# Patient Record
Sex: MALE | Race: WHITE | Employment: FULL TIME | URBAN - METROPOLITAN AREA
[De-identification: names, ages, dates, MRNs, and addresses within clinical notes are randomized per-mention and may not be internally consistent; named-entity substitution may affect disease eponyms.]

---

## 2021-10-28 ENCOUNTER — HOSPITAL ENCOUNTER (OUTPATIENT)
Dept: PHYSICAL THERAPY | Age: 66
Discharge: HOME | End: 2021-10-28
Payer: COMMERCIAL

## 2021-10-28 PROCEDURE — 97110 THERAPEUTIC EXERCISES: CPT

## 2021-10-28 PROCEDURE — 97140 MANUAL THERAPY 1/> REGIONS: CPT

## 2021-10-28 PROCEDURE — 97162 PT EVAL MOD COMPLEX 30 MIN: CPT

## 2021-10-28 NOTE — PROGRESS NOTES
[address] AT [address], [address] [place] [address] Road  Phone: [phone]   [phone]  PLAN OF CARE / [address] [place] [address] PHYSICAL THERAPY SERVICES  Patient Name: Wesley Monday : 1955   Medical   Diagnosis: Neck pain [M54.2] Treatment Diagnosis: Neck pain [M54.2]  Pain in right shoulder [M25.511]   Onset Date: 2021     Referral Source: Yoana Garcia,  Start of Care [facility]): 10/28/2021   Prior Hospitalization: See medical history Provider #: [id number]   Prior Level of Function: No limitations to ADL/IADL's from shoulder/neck pain; works full time in [employer]   Comorbidities: Afib s/p cardiac ablation 2021, 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   ===========================================================================================  Assessment / key information:  Pt is a 77y.o. year old RHD male with subjective complaints of R shoulder and neck pain that started insidiously in 2021. Pt reports remote h/o neck pain ~10 years ago which resolved after cortisone injection. Pt states shoulder pain has progressively worsened since onset and limits ability to sleep, reach or lift. Denies red flags. Pt was seen by MD with xrays taken, however pt is unsure of findings; does not recall any arthritis. Current pain is rated as 4 to 7/10; localized primarily to R shoulder, also extending to neck and down RUE down medial arm to digits 4/5. Current functional limitations: R sidelying, reaching, lifting, turning head. FOTO score= 49/100 indicating 51% impairment to functional activities.     Today's evaulation is significant for   POSTURE/OBSERVATION: significant FHP, fwd shoulder with [address] IR  FUNCTIONAL ASSESSMENT: seated shoulder flexion 90 deg with UT compensation and p!  ROM CERVICAL:  Flex 45 (min central neck discomfort); ext 40 (min neck discomfort); Lat flex R 15 (ipsilat neck p!), L 30 (ipsilat neck pain); ROT R 54 (ipsilat neck pain), L 52 (ipsilat neck pain)            SHOULDER:  Flex 98/107 (p!), Abd 80/90 (p!);  ER (@~30 deg scaption) 11 (p!). IR (@~30 deg scaption) 55 (p!). L UE grossly WNL throughout. FIR R to lateral glute, L T6  STRENGTH  SHOULDER:  Right  Flex/Abd 3-/5 (p!); ER/IR 4+/5. Biceps 5/5. LUE grossly 5/5. Fair- cervical retraction sitting/supine. SPECIAL TESTS:  CERVICAL:  (-) cervical compression, distraction, Spurling's      SHOULDER:  (+) speed's, lift off, cross arm, neer's. (-) empty, yergerson  ADDITIONAL FINDINGS:   CERVICAL: Moderate ttp and increased tone to b/l cervical paraspinals, sub-occipitals. Limited tolerance for PIVM assessment on cervical spine 2/2 ttp to paraspinals. SHOULDER:  Significant ttp to Right shoulder: LHB, supraspinatus, infraspinatus, teres minor, deltoids. Grade 2 inf GH joint glide with pain c/o. These findings are supportive for diagnosis of cervicalgia with R shoulder adhesive capsulitis. Pt will be a good candidate for skilled PT to address these impairments and promote return to normal ADLs and functional mobility for improved quality of life.    ===========================================================================================  Eval Complexity: History MEDIUM  Complexity : 1-2 comorbidities / personal factors will impact the outcome/ POC ;  Examination  HIGH Complexity : 4+ Standardized tests and measures addressing body structure, function, activity limitation and / or participation in recreation ; Presentation MEDIUM Complexity : Evolving with changing characteristics ;   Decision Making MEDIUM Complexity : FOTO score of 26-74; Overall Complexity MEDIUM  Problem List: pain affecting function, decrease ROM, decrease strength, decrease ADL/ functional abilitiies, decrease activity tolerance and decrease flexibility/ joint mobility   Treatment Plan may include any combination of the following: Therapeutic exercise, Therapeutic activities, Neuromuscular re-education, Physical agent/modality, Manual therapy, Patient education and Self Care training  Patient / Family readiness to learn indicated by: asking questions, trying to perform skills and interest  Persons(s) to be included in education: patient (P) and family support person (FSP);list Gene [place], spouse  Barriers to Learning/Limitations: None  Measures taken, if barriers to learning:    Patient Goal (s): \"relieve pressure from nerve\"   Patient self reported health status: good  Rehabilitation Potential: good   Short Term Goals: To be accomplished in  4  treatments:  1. Pt will be educated in appropriate HEP to decrease pain, increase ROM, increase strength and return pt to PLOF. 2. Pt will increase R shoulder PROM by >/= 15 deg in order to improve ease with dressing. 3. Pt will demonstrate good supine cervical retraction for improved axial stability with sitting/standing ADL's.  Long Term Goals: To be accomplished in  4-6  weeks:  1. Pt will improve FOTO score to >/= 62 to demo a significant improvement in functional activity tolerance. 2. Pt will increase seated shoulder flexion to at least 130 for improved ease to reach to cabinets. ([facility] 90 with UT compensations and p!)  3. Pt will increase R FIR to at least L5 for improved ease with dressing. 4. Pt will increase b/l cervical SB to at least 40 deg for improved ease with sleeping. Frequency / Duration:   Patient to be seen  1-2  times per week for 4-6  weeks: All LTG as above will be assessed and updated every 10 visits or 30 days and progressed as needed    Patient / Caregiver education and instruction: self care, activity modification and exercises  Therapist Signature: Kenton Lopez, PT Date: 74/64/9368   Certification Period: 10/28/21 to 1/26/22 Time: 11:33 AM   ===========================================================================================  I certify that the above Physical Therapy Services are being furnished while the patient is under my care. I agree with the treatment plan and certify that this therapy is necessary. Physician Signature:        Date:       Time:        Lissy Maldonado, DO  Please sign and return to [facility] AT [facility] UNIT or you may fax the signed copy to [phone]. Thank you.

## 2021-10-28 NOTE — PROGRESS NOTES
PHYSICAL THERAPY - DAILY TREATMENT NOTE    Patient Name: Barron Lugo        Date: 10/28/2021  : 1955   yes Patient  Verified  Visit #:   1     Insurance: Payor: Fer Ferguson / Plan: [address] [place] [address] [employer] HMO / Product Type: HMO /      In time: 3:54 Out time: 4:47   Total Treatment Time: 53     Medicare/BCBS Time Tracking (below)   Total Timed Codes (min):  53 1:1 Treatment Time:  53     TREATMENT AREA =  Neck pain [M54.2]    SUBJECTIVE  Pain Level (on 0 to 10 scale):  6  / 10   Medication Changes/New allergies or changes in medical history, any new surgeries or procedures?    no  If yes, update Summary List   Subjective Functional Status/Changes:  []  No changes reported     See POC          OBJECTIVE    See exam on chart for details on objective findings    10 min Therapeutic Exercise:  [x]  See flow sheet   Rationale:      increase ROM and increase strength to improve the patients ability to lift, reach for ADL's     15 min Manual Therapy: STM R biceps, pecs, deltoid. PROM R shoulder to tolerance all planes. Gentle GH joint oscillations for decreased mm guarding. Grade 2-3 post GH joint mobs (not tolerated inf. Mobs)   Rationale:      decrease pain, increase ROM, increase tissue extensibility and decrease trigger points to improve patient's ability to lift, reach for ADL's  The manual therapy interventions were performed at a separate and distinct time from the therapeutic activities interventions. Billed With/As:   [x] TE   [] TA   [] Neuro   [] Self Care Patient Education: [x] Review HEP    [] Progressed/Changed HEP based on:   [] positioning   [] body mechanics   [] transfers   [] heat/ice application    [] other:      Other Objective/Functional Measures:    Review HEP, handout created and issued to pt. as per chart. Pt educated in spinal/shoulder anatomy, function and dysfunction as related to diagnosis. Instructed in open packed position with supine lying/reclined for sleeping.  Reviewed POC and goals. Pt reports understanding. Post Treatment Pain Level (on 0 to 10) scale:   4-5  / 10     ASSESSMENT  Assessment/Changes in Function:     See POC     []  See Progress Note/Recertification   Patient will continue to benefit from skilled PT services to modify and progress therapeutic interventions, address functional mobility deficits, address ROM deficits, address strength deficits, analyze and address soft tissue restrictions, analyze and cue movement patterns, analyze and modify body mechanics/ergonomics, assess and modify postural abnormalities and instruct in home and community integration to attain remaining goals. Progress toward goals / Updated goals:    See POC     PLAN  []  Upgrade activities as tolerated yes Continue plan of care   []  Discharge due to :    []  Other:      Therapist: Erika Rolon.  Jono Murdock, PT    Date: 10/28/2021 Time: 11:33 AM     Future Appointments   Date Time Provider Narciso Hancock   11/3/2021  7:45 AM Abby Sethi [facility]   11/5/2021  8:30 AM Jono Carrasco, PTA [facility]   11/8/2021  5:30 PM Jono Carrasco, PTA [facility]   11/11/2021  6:00 PM Joan Bank [facility]   11/16/2021  7:45 AM Yohana Jarrett [facility]   11/18/2021  7:45 AM Jono Carrasco, PTA [facility]   11/22/2021  5:30 PM Jono Carrasco, PTA [facility]   11/24/2021  3:45 PM Joan Bank [facility]   11/30/2021  5:30 PM [facility]   12/2/2021  6:00 PM [facility]

## 2021-11-03 ENCOUNTER — HOSPITAL ENCOUNTER (OUTPATIENT)
Dept: PHYSICAL THERAPY | Age: 66
Discharge: HOME | End: 2021-11-03
Payer: COMMERCIAL

## 2021-11-03 PROCEDURE — 97110 THERAPEUTIC EXERCISES: CPT

## 2021-11-03 PROCEDURE — 97140 MANUAL THERAPY 1/> REGIONS: CPT

## 2021-11-03 NOTE — PROGRESS NOTES
PHYSICAL THERAPY - DAILY TREATMENT NOTE      Patient Name: Austin Buckley        Date: 11/3/2021  : 1955   YES Patient  Verified  Visit #:   2   of     Insurance: Payor: Tanvir Haji / Plan: [employer] HMO / Product Type: HMO /      In time: 7:48a Out time: 8:48a   Total Treatment Time: 60     Medicare Time Tracking (below)   Total Timed Codes (min):  50 1:1 Treatment Time:  50     TREATMENT AREA =  Neck pain [M54.2]    SUBJECTIVE    Pain Level (on 0 to 10 scale):  6 / 10   Medication Changes/New allergies or changes in medical history, any new surgeries or procedures? NO    If yes, update Summary List   Subjective Functional Status/Changes:  []  No changes reported     Pt reports he has been working on his HEP and it is going well. OBJECTIVE    Modalities Rationale:        min [] Estim, type/location:                                      []  att     []  unatt     []  w/US     []  w/ice    []  w/heat    min []  Mechanical Traction: type/lbs                   []  pro   []  sup   []  int   []  cont    []  before manual    []  after manual    min []  Ultrasound, settings/location:      min []  Iontophoresis w/ dexamethasone, location:                                               []  take home patch       []  in clinic   10 min []  Ice     [x]  Heat    location/position:  To c spine and R shoulder in supine    min []  Vasopneumatic Device, press/temp: If using vaso (only need to measure limb vaso being performed on)      pre-treatment girth :       post-treatment girth :       measured at (landmark location) :    min []  Other:    [] Skin assessment post-treatment (if applicable):    []  intact    []  redness- no adverse reaction     []redness  adverse reaction:      40 min Therapeutic Exercise:  [x]  See flow sheet   Rationale:      increase ROM and increase strength to improve the patients ability to reach overhead     10 min Manual Therapy: Gr III inferior glides to R shoulder followed by PROM in all planes, stm to cervical paraspinals and manual upper trap stretching. Rationale:      decrease pain and increase tissue extensibility to improve patient's ability to reach overhead  [The manual therapy interventions were performed at a separate and distinct time from the therapeutic activities interventions]    x min Patient Education:  YES  Reviewed HEP   []  Progressed/Changed HEP based on: Other Objective/Functional Measures:    Program initiated today-1st follow up     Post Treatment Pain Level (on 0 to 10) scale:   3  / 10     ASSESSMENT    Assessment/Changes in Function:   Pt required vc and demo with all exercises to perform correctly. He was challenged with wall ladder and FIR with strap reporting fatigue and mild inc in sx following activities which he reported was tolerable to continue. Pt responded well to manual therapy reporting dec in sx following tx. Pt was noted to be most limited in ER/IR. PT considering utilizing heat prior to tx NV for improved tissue pliability. []  See Progress Note/Recertification   Patient will continue to benefit from skilled PT services to modify and progress therapeutic interventions, address functional mobility deficits, address ROM deficits, address strength deficits, analyze and address soft tissue restrictions, analyze and cue movement patterns, analyze and modify body mechanics/ergonomics, assess and modify postural abnormalities, address imbalance/dizziness and instruct in home and community integration to attain remaining goals. Progress toward goals / Updated goals: · Short Term Goals: To be accomplished in  4  treatments:  1. Pt will be educated in appropriate HEP to decrease pain, increase ROM, increase strength and return pt to PLOF.   pt reports compliance with initial HEP 11/3/21  2. Pt will increase R shoulder PROM by >/= 15 deg in order to improve ease with dressing.   3. Pt will demonstrate good supine cervical retraction for improved axial stability with sitting/standing ADL's.     · Long Term Goals: To be accomplished in  4-6  weeks:  1. Pt will improve FOTO score to >/= 62 to demo a significant improvement in functional activity tolerance. 2. Pt will increase seated shoulder flexion to at least 130 for improved ease to reach to cabinets. ([facility] 90 with UT compensations and p!)  3. Pt will increase R FIR to at least L5 for improved ease with dressing. 4. Pt will increase b/l cervical SB to at least 40 deg for improved ease with sleeping.      PLAN    []  Upgrade activities as tolerated YES Continue plan of care   []  Discharge due to :    []  Other:      Therapist: Meme Jordan    Date: 11/3/2021 Time: 6:56 AM     Future Appointments   Date Time Provider Narciso Hancock   11/3/2021  7:45 AM Milus Eth [facility]   11/5/2021  8:30 AM Arianna King, PTA [facility]   11/8/2021  5:30 PM Arianna King, PTA [facility]   11/11/2021  6:00 PM Milus Eth [facility]   11/16/2021  7:45 AM Kaylie Ayon [facility]   11/18/2021  7:45 AM Arianna King, PTA [facility]   11/22/2021  5:30 PM Arianna King, PTA [facility]   11/24/2021  3:45 PM Milus Eth [address] [facility]   11/30/2021  5:30 PM Milus Eth [facility]   12/2/2021  6:00 PM Milus Eth [facility]

## 2021-11-05 ENCOUNTER — HOSPITAL ENCOUNTER (OUTPATIENT)
Dept: PHYSICAL THERAPY | Age: 66
Discharge: HOME | End: 2021-11-05
Payer: COMMERCIAL

## 2021-11-05 PROCEDURE — 97110 THERAPEUTIC EXERCISES: CPT

## 2021-11-05 PROCEDURE — 97140 MANUAL THERAPY 1/> REGIONS: CPT

## 2021-11-05 NOTE — PROGRESS NOTES
PHYSICAL THERAPY - DAILY TREATMENT NOTE    Patient Name: Austin Buckley        Date: 2021  : 1955   yes Patient  Verified  Visit #:   3     Insurance: Payor: Tanvir Haji / Plan: [employer] HMO / Product Type: HMO /      In time: 8:31 Out time: 9:44   Total Treatment Time: 73     Medicare/BCBS Time Tracking (below)   Total Timed Codes (min):   1:1 Treatment Time:       TREATMENT AREA =  Neck pain [M54.2]  Pain in right shoulder [M25.511]    SUBJECTIVE  Pain Level (on 0 to 10 scale):  4  / 10   Medication Changes/New allergies or changes in medical history, any new surgeries or procedures?    no  If yes, update Summary List   Subjective Functional Status/Changes:  []  No changes reported     My shoulder bothers me the most when I am sleeping as well as when I raise my arm up and lift up or try to lift anything.         OBJECTIVE  Modalities Rationale:     decrease inflammation and decrease pain to improve patient's ability to improve functional abilities    min [] Estim, type/location:                                      []  att     []  unatt     []  w/US     []  w/ice    []  w/heat    min []  Mechanical Traction: type/lbs                   []  pro   []  sup   []  int   []  cont    []  before manual    []  after manual    min []  Ultrasound, settings/location:      min []  Iontophoresis w/ dexamethasone, location:                                               []  take home patch       []  in clinic   10 min [x]  Ice     []  Heat    location/position: R shoulder/seated    min []  Vasopneumatic Device, press/temp:    If using vaso (only need to measure limb vaso being performed on)      pre-treatment girth :       post-treatment girth :       measured at (landmark location) :    Vasopnuematic compression justification:  Per bilateral girth measures taken and listed above the edema is considered significant and having an impact on the patient's     min []  Other:    [] Skin assessment post-treatment (if applicable):    []  intact    []  redness- no adverse reaction                  []redness  adverse reaction:      43 min Therapeutic Exercise:  [x]  See flow sheet   Rationale:      increase ROM and increase strength to improve the patients ability to improve functional abilities      20 min Manual Therapy: STM/tissue mobs to R proximal shoulder/lateral scapular musculature, sidelying scapular mobs, supine GH distraction, grade 3 posterior/inferior GH mobs and GH PROM all planes   Rationale:      decrease pain, increase ROM, increase tissue extensibility, decrease trigger points and increase postural awareness to improve patient's ability to improve functional mobility   The manual therapy interventions were performed at a separate and distinct time from the therapeutic activities interventions. Billed With/As:   [] TE   [] TA   [] Neuro   [] Self Care Patient Education: [x] Review HEP    [] Progressed/Changed HEP based on:   [] positioning   [] body mechanics   [] transfers   [] heat/ice application    [] other:      Other Objective/Functional Measures:  Extensive verbal cueing/demonstration with all therex      Post Treatment Pain Level (on 0 to 10) scale:   3-4  / 10     ASSESSMENT  Assessment/Changes in Function:   Pt presented with chief c/o increased shoulder pain/symptoms with sleeping as well as reaching and lifting type ADL's. Pt was very restricted with all [address] PROM in all planes especially in abduction and ER planes even with extensive multi transitional passive stretching between all planes. Will continue to progress/advance patient within current POC as tolerated with monitoring symptoms.      []  See Progress Note/Recertification   Patient will continue to benefit from skilled PT services to modify and progress therapeutic interventions, address functional mobility deficits, address ROM deficits, address strength deficits, analyze and address soft tissue restrictions, analyze and cue movement patterns, analyze and modify body mechanics/ergonomics, assess and modify postural abnormalities and instruct in home and community integration to attain remaining goals. Progress toward goals / Updated goals: · Short Term Goals: To be accomplished in  4  treatments:  1.  Pt will be educated in appropriate HEP to decrease pain, increase ROM, increase strength and return pt to PLOF.   pt reports compliance with initial HEP 11/3/21  2. Pt will increase R shoulder PROM by >/= 15 deg in order to improve ease with dressing. 3. Pt will demonstrate good supine cervical retraction for improved axial stability with sitting/standing ADL's.     · Long Term Goals: To be accomplished in  4-6  weeks:  1. Pt will improve FOTO score to >/= 62 to demo a significant improvement in functional activity tolerance. 2. Pt will increase seated shoulder flexion to at least 130 for improved ease to reach to cabinets. ([facility] 90 with [place] compensations and p!)  3. Pt will increase R FIR to at least L5 for improved ease with dressing. 4. Pt will increase b/l cervical SB to at least 40 deg for improved ease with sleeping.      PLAN  [x]  Upgrade activities as tolerated yes Continue plan of care   []  Discharge due to :    []  Other:      Therapist: Juan Jose Salinas PTA    Date: 11/5/2021 Time: 8:57 AM     Future Appointments   Date Time Provider Narciso Hancock   11/8/2021  5:30 PM Arianna King PTA [facility] [address]   11/11/2021  6:00 PM [facility] [address]   11/16/2021  7:45 AM Kaylie Ayon [facility] [address]   11/18/2021  7:45 AM Arianna King PTA [facility] [address]   11/22/2021  5:30 PM Arianna King PTA [facility] [address]   11/24/2021  3:45 PM [facility] [address]   11/30/2021  5:30 PM [facility] [address]   12/2/2021  6:00 PM [facility] [address]

## 2021-11-08 ENCOUNTER — HOSPITAL ENCOUNTER (OUTPATIENT)
Dept: PHYSICAL THERAPY | Age: 66
Discharge: HOME | End: 2021-11-08
Payer: COMMERCIAL

## 2021-11-08 PROCEDURE — 97140 MANUAL THERAPY 1/> REGIONS: CPT

## 2021-11-08 PROCEDURE — 97110 THERAPEUTIC EXERCISES: CPT

## 2021-11-08 NOTE — PROGRESS NOTES
PHYSICAL THERAPY - DAILY TREATMENT NOTE    Patient Name: Barron Lugo        Date: 2021  : 1955   yes Patient  Verified  Visit #:   4     Insurance: Payor: Fer Ferguson / Plan: [address] [place] [address] [employer] / Product Type: HMO /      In time: 5:30 Out time: 6:45   Total Treatment Time: 75     Medicare/[facility] Time Tracking (below)   Total Timed Codes (min):   1:1 Treatment Time:       TREATMENT AREA =  Neck pain [M54.2]  Pain in right shoulder [M25.511]    SUBJECTIVE  Pain Level (on 0 to 10 scale):  3  / 10   Medication Changes/New allergies or changes in medical history, any new surgeries or procedures?    no  If yes, update Summary List   Subjective Functional Status/Changes:  []  No changes reported     My shoulder was kind of sore for a little while after I was here last time, especially at night, but I kind of expected it.          OBJECTIVE  Modalities Rationale:     decrease inflammation and decrease pain to improve patient's ability to improve functional abilities    min [] Estim, type/location:                                      []  att     []  unatt     []  w/US     []  w/ice    []  w/heat    min []  Mechanical Traction: type/lbs                   []  pro   []  sup   []  int   []  cont    []  before manual    []  after manual    min []  Ultrasound, settings/location:      min []  Iontophoresis w/ dexamethasone, location:                                               []  take home patch       []  in clinic   10 min [x]  Ice     []  Heat    location/position: R shoulder/seated    min []  Vasopneumatic Device, press/temp:    If using vaso (only need to measure limb vaso being performed on)      pre-treatment girth :       post-treatment girth :       measured at (landmark location) :    Vasopnuematic compression justification:  Per bilateral girth measures taken and listed above the edema is considered significant and having an impact on the patient's     min []  Other:    [] Skin assessment post-treatment (if applicable):    []  intact    []  redness- no adverse reaction                  []redness  adverse reaction:      45 min Therapeutic Exercise:  [x]  See flow sheet   Rationale:      increase ROM and increase strength to improve the patients ability to improve functional abilities      20 min Manual Therapy: STM/tissue mobs to R proximal shoulder/lateral scapular musculature, sidelying scapular mobs, supine GH distraction, grade 3 posterior/inferior GH mobs and GH PROM all planes   Rationale:      decrease pain, increase ROM, increase tissue extensibility, decrease trigger points and increase postural awareness to improve patient's ability to improve functional mobility   The manual therapy interventions were performed at a separate and distinct time from the therapeutic activities interventions. Billed With/As:   [] TE   [] TA   [] Neuro   [] Self Care Patient Education: [x] Review HEP    [] Progressed/Changed HEP based on:   [] positioning   [] body mechanics   [] transfers   [] heat/ice application    [] other:      Other Objective/Functional Measures: Addition of low doorway pec stretches and posterior capsule/cross body stretches to program today     Post Treatment Pain Level (on 0 to 10) scale:   1  / 10     ASSESSMENT  Assessment/Changes in Function:   Pt presented with chief c/o moderate post exercise/treatment soreness after last treatment, but was able to tolerate same regiment today including addition of low doorway pec stretches and posterior capsule/cross body stretches with min to mod challenge. Pt was educated on prognosis and expectations with adhesive capsulitis progression today. Will continue to progress/advance patient within current POC as tolerated with monitoring symptoms.      []  See Progress Note/Recertification   Patient will continue to benefit from skilled PT services to modify and progress therapeutic interventions, address functional mobility deficits, address ROM deficits, address strength deficits, analyze and address soft tissue restrictions, analyze and cue movement patterns, analyze and modify body mechanics/ergonomics, assess and modify postural abnormalities and instruct in home and community integration to attain remaining goals. Progress toward goals / Updated goals: · Short Term Goals: To be accomplished in  4  treatments:  1.  Pt will be educated in appropriate HEP to decrease pain, increase ROM, increase strength and return pt to PLOF.   pt reports compliance with initial HEP 11/3/21  2. Pt will increase R shoulder PROM by >/= 15 deg in order to improve ease with dressing. 3. Pt will demonstrate good supine cervical retraction for improved axial stability with sitting/standing ADL's.     · Long Term Goals: To be accomplished in  4-6  weeks:  1. Pt will improve FOTO score to >/= 62 to demo a significant improvement in functional activity tolerance. 2. Pt will increase seated shoulder flexion to at least 130 for improved ease to reach to cabinets. ([facility] 90 with [place] compensations and p!)  3. Pt will increase R FIR to at least L5 for improved ease with dressing. 4. Pt will increase b/l cervical SB to at least 40 deg for improved ease with sleeping.      PLAN  [x]  Upgrade activities as tolerated yes Continue plan of care   []  Discharge due to :    []  Other:      Therapist: Deangelo Bryant PTA    Date: 11/8/2021 Time: 5:49 PM     Future Appointments   Date Time Provider Narciso Hancock   11/11/2021  7:45 AM Ambrosio Antunez PT [facility]   11/16/2021  7:45 AM Lawyer Driscoll [facility]   11/18/2021  7:45 AM Lizzy Quiroga PTA [facility]   11/22/2021  5:30 PM Lizzy Quiroga PTA [facility]   11/24/2021  3:45 PM Susie Clark [id number] [facility]   11/30/2021  5:30 PM Susie Clark [facility]   12/2/2021  6:00 PM Susie Clark [facility]

## 2021-11-11 ENCOUNTER — HOSPITAL ENCOUNTER (OUTPATIENT)
Dept: PHYSICAL THERAPY | Age: 66
Discharge: HOME | End: 2021-11-11
Payer: COMMERCIAL

## 2021-11-11 PROCEDURE — 97140 MANUAL THERAPY 1/> REGIONS: CPT

## 2021-11-11 PROCEDURE — 97110 THERAPEUTIC EXERCISES: CPT

## 2021-11-11 NOTE — PROGRESS NOTES
PHYSICAL THERAPY - DAILY TREATMENT NOTE    Patient Name: Cami Orozco        Date: 2021  : 1955   yes Patient  Verified  Visit #:   5     Insurance: Payor: Brenda Blue / Plan: [employer] HMO / Product Type: HMO /      In time: 7:44 Out time: 8:53   Total Treatment Time: 69     Medicare/[facility] Time Tracking (below)   Total Timed Codes (min):   1:1 Treatment Time:       TREATMENT AREA =  Neck pain [M54.2]  Pain in right shoulder [M25.511]    SUBJECTIVE  Pain Level (on 0 to 10 scale):  -7 / 10   Medication Changes/New allergies or changes in medical history, any new surgeries or procedures?    no  If yes, update Summary List   Subjective Functional Status/Changes:  []  No changes reported     My shoulder has been hurting a lot more since I was here last, I don't remember doing anything out of the ordinary that would have flared it up,but I want you all to do what you need to do to get it better. OBJECTIVE    46 min Therapeutic Exercise:  [x]  See flow sheet   Rationale:      increase ROM and increase strength to improve the patients ability to improve functional abilities      23 min Manual Therapy: STM/tissue mobs to R proximal shoulder/lateral scapular musculature, sidelying scapular mobs, supine GH distraction, grade 3 posterior/inferior GH mobs and GH PROM all planes   Rationale:      decrease pain, increase ROM, increase tissue extensibility, decrease trigger points and increase postural awareness to improve patient's ability to improve functional mobility   The manual therapy interventions were performed at a separate and distinct time from the therapeutic activities interventions.     Billed With/As:   [] TE   [] TA   [] Neuro   [] Self Care Patient Education: [x] Review HEP    [] Progressed/Changed HEP based on:   [] positioning   [] body mechanics   [] transfers   [] heat/ice application    [] other:      Other Objective/Functional Measures:    No reoccurrence of R UE radicular numbness/tingling since initial evaluation      Post Treatment Pain Level (on 0 to 10) scale:   4  / 10     ASSESSMENT  Assessment/Changes in Function:   Pt presented with chief c/o increased post exercise pain/soreness with no particular change/increase in activity after last treatment. Pt's possible options and prognosis was discussed including cortisone injection as well as manipulation under anesthesia. Pt's progress with gaining mobility will be monitored over the next 2 visits and he will be referred back to MD for further consideration of these options if he does not show any improvement. Will continue to progress/advance patient within current POC as tolerated with monitoring symptoms. []  See Progress Note/Recertification   Patient will continue to benefit from skilled PT services to modify and progress therapeutic interventions, address functional mobility deficits, address ROM deficits, address strength deficits, analyze and address soft tissue restrictions, analyze and cue movement patterns, analyze and modify body mechanics/ergonomics, assess and modify postural abnormalities and instruct in home and community integration to attain remaining goals. Progress toward goals / Updated goals: · Short Term Goals: To be accomplished in  4  treatments:  1.  Pt will be educated in appropriate HEP to decrease pain, increase ROM, increase strength and return pt to PLOF.   pt reports compliance with initial HEP 11/3/21  2. Pt will increase R shoulder PROM by >/= 15 deg in order to improve ease with dressing. 3. Pt will demonstrate good supine cervical retraction for improved axial stability with sitting/standing ADL's.11/11/21: Met with ability to demonstrate today     · Long Term Goals: To be accomplished in  4-6  weeks:  1. Pt will improve FOTO score to >/= 62 to demo a significant improvement in functional activity tolerance.   2. Pt will increase seated shoulder flexion to at least 130 for improved ease to reach to cabinets. ([facility] 90 with [place] compensations and p!)  3. Pt will increase R FIR to at least L5 for improved ease with dressing. 4. Pt will increase b/l cervical SB to at least 40 deg for improved ease with sleeping.      PLAN  [x]  Upgrade activities as tolerated yes Continue plan of care   []  Discharge due to :    []  Other:      Therapist: Bao Sears PTA    Date: 11/11/2021 Time: 7:45 AM     Future Appointments   Date Time Provider Narciso Hancock   11/16/2021  7:45 AM John Rubio [facility]   11/18/2021  7:45 AM Ceci Malone PTA [facility]   11/22/2021  5:30 PM Ceci Malone PTA [facility]   11/24/2021  3:45 PM Carlos Steiner [employer] [facility]   11/30/2021  5:30 PM Carlos Steiner [facility]   12/2/2021  6:00 PM Carlos Steiner [facility]

## 2021-11-16 ENCOUNTER — HOSPITAL ENCOUNTER (OUTPATIENT)
Dept: PHYSICAL THERAPY | Age: 66
Discharge: HOME | End: 2021-11-16
Payer: COMMERCIAL

## 2021-11-16 PROCEDURE — 97140 MANUAL THERAPY 1/> REGIONS: CPT

## 2021-11-16 PROCEDURE — 97110 THERAPEUTIC EXERCISES: CPT

## 2021-11-16 NOTE — PROGRESS NOTES
PHYSICAL THERAPY - DAILY TREATMENT NOTE    Patient Name: Romie Franco        Date: 2021  : 1955   yes Patient  Verified  Visit #:   6     Insurance: Payor: Josh Aldridge / Plan: [employer] Sharp HMO / Product Type: HMO /      In time: 7:48 Out time: 8:52   Total Treatment Time: 64     Medicare/[facility] Time Tracking (below)   Total Timed Codes (min):   1:1 Treatment Time:       TREATMENT AREA =  Neck pain [M54.2]  Pain in right shoulder [M25.511]    SUBJECTIVE  Pain Level (on 0 to 10 scale):  4 / 10   Medication Changes/New allergies or changes in medical history, any new surgeries or procedures?    no  If yes, update Summary List   Subjective Functional Status/Changes:  []  No changes reported   Patient reports he slept on his shoulder wrong and got some pain last night. He has noticed that he is gaining flexibility. This morning he was emptying the  and he has been putting the plates away with his bad arm and he noticed he is able to reach the shelf now, it is painful though. OBJECTIVE    49 min Therapeutic Exercise:  [x]  See flow sheet   Rationale:      increase ROM and increase strength to improve the patients ability to improve functional abilities      15 min Manual Therapy: STM/tissue mobs to R proximal shoulder/lateral scapular musculature, sidelying scapular mobs, supine GH distraction, grade 3 posterior/inferior GH mobs and GH PROM all planes   Rationale:      decrease pain, increase ROM, increase tissue extensibility, decrease trigger points and increase postural awareness to improve patient's ability to improve functional mobility   The manual therapy interventions were performed at a separate and distinct time from the therapeutic activities interventions.     Billed With/As:   [x] TE   [] TA   [] Neuro   [] Self Care Patient Education: [x] Review HEP    [] Progressed/Changed HEP based on:   [] positioning   [] body mechanics   [] transfers   [] heat/ice application    [x] other: education on use of theracane to address trigger points in UT and posterior cuff at home     Other Objective/Functional Measures:   - minimal difficulty with therex  - moderate pain with MT      Post Treatment Pain Level (on 0 to 10) scale:   3  / 10     ASSESSMENT  Assessment/Changes in Function:   Patient tolerated session well, minimal challenge with therex, minimal increase in pain. Moderate pain with MT with continues trigger points and tightness in right UT and posterior cuff. Educated patient on use of theracane at home, demo and pt verbalizes understanding. []  See Progress Note/Recertification   Patient will continue to benefit from skilled PT services to modify and progress therapeutic interventions, address functional mobility deficits, address ROM deficits, address strength deficits, analyze and address soft tissue restrictions, analyze and cue movement patterns, analyze and modify body mechanics/ergonomics, assess and modify postural abnormalities and instruct in home and community integration to attain remaining goals. Progress toward goals / Updated goals: · Short Term Goals: To be accomplished in  4  treatments:  1.  Pt will be educated in appropriate HEP to decrease pain, increase ROM, increase strength and return pt to PLOF.   pt reports compliance with initial HEP 11/3/21  2. Pt will increase R shoulder PROM by >/= 15 deg in order to improve ease with dressing. 3. Pt will demonstrate good supine cervical retraction for improved axial stability with sitting/standing ADL's.11/11/21: Met with ability to demonstrate today     · Long Term Goals: To be accomplished in  4-6  weeks:  1. Pt will improve FOTO score to >/= 62 to demo a significant improvement in functional activity tolerance. 2. Pt will increase seated shoulder flexion to at least 130 for improved ease to reach to cabinets. ([place] 90 with [place] compensations and p!) 96 deg on 11/16/21  3.  Pt will increase R FIR to at least L5 for improved ease with dressing. 4. Pt will increase b/l cervical SB to at least 40 deg for improved ease with sleeping.      PLAN  [x]  Upgrade activities as tolerated yes Continue plan of care   []  Discharge due to :    []  Other:      Therapist: CONTRERAS Nation    Date: 11/16/2021 Time: 8:52 AM     Future Appointments   Date Time Provider Narciso Hancock   11/16/2021  7:45 AM Kellee Restrepo [facility]   11/18/2021  7:45 AM Rhett Alex, PTA [facility]   11/22/2021  5:30 PM hRett Alex, PTA [facility]   11/24/2021  3:45 PM Nery Falcons [facility]   11/30/2021  5:30 PM Luba Maradiaga [facility]   12/2/2021  6:00 PM Rodriguezdenpat Mems [facility]

## 2021-11-18 ENCOUNTER — HOSPITAL ENCOUNTER (OUTPATIENT)
Dept: PHYSICAL THERAPY | Age: 66
Discharge: HOME | End: 2021-11-18
Payer: COMMERCIAL

## 2021-11-18 PROCEDURE — 97140 MANUAL THERAPY 1/> REGIONS: CPT

## 2021-11-18 PROCEDURE — 97110 THERAPEUTIC EXERCISES: CPT

## 2021-11-18 NOTE — PROGRESS NOTES
PHYSICAL THERAPY - DAILY TREATMENT NOTE    Patient Name: Ryan Fam        Date: 2021  : 1955   yes Patient  Verified  Visit #:     Insurance: Payor: Maritza Clinton / Plan: Avani Granados HMO / Product Type: HMO /      In time: 7:48 Out time: 8:59   Total Treatment Time: 71     Medicare/[facility] Time Tracking (below)   Total Timed Codes (min):   1:1 Treatment Time:       TREATMENT AREA =  Neck pain [M54.2]  Pain in right shoulder [M25.511]    SUBJECTIVE  Pain Level (on 0 to 10 scale):  8  / 10   Medication Changes/New allergies or changes in medical history, any new surgeries or procedures?    no  If yes, update Summary List   Subjective Functional Status/Changes:  []  No changes reported     My shoulder has been hurting a lot more than usual since right before I went to bed last night and I had a hard time sleeping last night as well as increased pain when I woke up this morning.            OBJECTIVE  Modalities Rationale:     decrease inflammation and decrease pain to improve patient's ability to improve functional abilities    min [] Estim, type/location:                                      []  att     []  unatt     []  w/US     []  w/ice    []  w/heat    min []  Mechanical Traction: type/lbs                   []  pro   []  sup   []  int   []  cont    []  before manual    []  after manual    min []  Ultrasound, settings/location:      min []  Iontophoresis w/ dexamethasone, location:                                               []  take home patch       []  in clinic   10 min [x]  Ice     []  Heat    location/position: R shoulder/seated    min []  Vasopneumatic Device, press/temp:    If using vaso (only need to measure limb vaso being performed on)      pre-treatment girth :       post-treatment girth :       measured at (landmark location) :    Vasopnuematic compression justification:  Per bilateral girth measures taken and listed above the edema is considered significant and having an impact on the patient's     min []  Other:    [] Skin assessment post-treatment (if applicable):    []  intact    []  redness- no adverse reaction                  []redness  adverse reaction:      41 min Therapeutic Exercise:  [x]  See flow sheet   Rationale:      increase ROM, increase strength, improve balance and increase proprioception to improve the patients ability to improve functional abilities      20 min Manual Therapy: STM/tissue mobs to R proximal shoulder/lateral scapular musculature, sidelying scapular mobs, supine GH distraction, grade 3 posterior/inferior GH mobs and GH PROM all planes   Rationale:      decrease pain, increase ROM, increase tissue extensibility, decrease trigger points and increase postural awareness to improve patient's ability to improve functional mobility   The manual therapy interventions were performed at a separate and distinct time from the therapeutic activities interventions. Billed With/As:   [] TE   [] TA   [] Neuro   [] Self Care Patient Education: [x] Review HEP    [] Progressed/Changed HEP based on:   [] positioning   [] body mechanics   [] transfers   [] heat/ice application    [] other:      Other Objective/Functional Measures:    Verbal cueing with therex for proper form/technique      Post Treatment Pain Level (on 0 to 10) scale:   3  / 10     ASSESSMENT  Assessment/Changes in Function:   Pt presented with chief c/o increased intensity of shoulder pain/symptoms with no specific change/increase in activity since last treatment, but was able to tolerate full normal therex regiment with moderate challenge/discomfort today. Pt did however demonstrate decreased mobility with manual/passive stretching compared to the past few treatments. Will review detailed progress/goals for physician update next treatment and advise referring MD if he regresses or shows no appreciable progress with mobility.      []  See Progress Note/Recertification   Patient will continue to benefit from skilled PT services to modify and progress therapeutic interventions, address functional mobility deficits, address ROM deficits, address strength deficits, analyze and address soft tissue restrictions, analyze and cue movement patterns, analyze and modify body mechanics/ergonomics, assess and modify postural abnormalities and instruct in home and community integration to attain remaining goals. Progress toward goals / Updated goals: · Short Term Goals: To be accomplished in  4  treatments:  1.  Pt will be educated in appropriate HEP to decrease pain, increase ROM, increase strength and return pt to PLOF.   pt reports compliance with initial HEP 11/3/21  2. Pt will increase R shoulder PROM by >/= 15 deg in order to improve ease with dressing. 3. Pt will demonstrate good supine cervical retraction for improved axial stability with sitting/standing ADL's.11/11/21: Met with ability to demonstrate today     · Long Term Goals: To be accomplished in  4-6  weeks:  1. Pt will improve FOTO score to >/= 62 to demo a significant improvement in functional activity tolerance. 2. Pt will increase seated shoulder flexion to at least 130 for improved ease to reach to cabinets. ([facility] 90 with [place] compensations and p!) 96 deg on 11/16/21  3. Pt will increase R FIR to at least L5 for improved ease with dressing. 4. Pt will increase b/l cervical SB to at least 40 deg for improved ease with sleeping.      PLAN  [x]  Upgrade activities as tolerated yes Continue plan of care   []  Discharge due to :    []  Other:      Therapist: Fabian Maradiaga PTA    Date: 11/18/2021 Time: 7:58 AM     Future Appointments   Date Time Provider Narciso Hancock   11/22/2021  5:30 PM Sally Arteaga [facility]   11/24/2021  3:45 PM Stephanie Orris [facility]   11/30/2021  5:30 PM Stephanie Orris [facility]   12/2/2021  6:00 PM Stephanie Orris [facility]   12/7/2021  6:00 PM Madhuri Willson., PT [facility]   12/9/2021  6:00 PM Jayna Younger [facility] 12/14/2021  6:00 PM Shirin Guerra., PT [facility]   12/16/2021  6:00 PM Britt Lever [facility]   12/21/2021  6:00 PM Aleida Fajardo, PT [facility]   12/23/2021  6:00 PM Britt Lever [facility]   12/28/2021  6:00 PM Shirin Vaca, PT [facility]   12/30/2021  6:00 PM Britt Lever [facility]

## 2021-11-22 ENCOUNTER — HOSPITAL ENCOUNTER (OUTPATIENT)
Dept: PHYSICAL THERAPY | Age: 66
Discharge: HOME | End: 2021-11-22
Payer: COMMERCIAL

## 2021-11-22 PROCEDURE — 97140 MANUAL THERAPY 1/> REGIONS: CPT

## 2021-11-22 PROCEDURE — 97110 THERAPEUTIC EXERCISES: CPT

## 2021-11-22 NOTE — PROGRESS NOTES
PHYSICAL THERAPY - DAILY TREATMENT NOTE    Patient Name: Kim Baker        Date: 2021  : 1955   yes Patient  Verified  Visit #:   8     Insurance: Payor: Norberto Holman / Plan: [address] [place] [address] [place] [employer] / Product Type: HMO /      In time: 5:29 Out time: 6:25   Total Treatment Time: 56     Medicare/[facility] Time Tracking (below)   Total Timed Codes (min):   1:1 Treatment Time:       TREATMENT AREA =  Neck pain [M54.2]  Pain in right shoulder [M25.511]    SUBJECTIVE  Pain Level (on 0 to 10 scale):  0  / 10   Medication Changes/New allergies or changes in medical history, any new surgeries or procedures?    no  If yes, update Summary List   Subjective Functional Status/Changes:  []  No changes reported     My shoulder is feeling a lot better than it did the last time I was here, I got my theracane and I have been working on those knots in my neck and shoulder blade with it and I think that it helps. OBJECTIVE    38 min Therapeutic Exercise:  [x]  See flow sheet   Rationale:      increase ROM and increase strength to improve the patients ability to improve functional abilities      18 min Manual Therapy: sidelying scapular mobs, supine GH distraction, grade 3 posterior/inferior GH mobs and GH PROM all planes   Rationale:      decrease pain, increase ROM, increase tissue extensibility, decrease trigger points and increase postural awareness to improve patient's ability to improve functional mobility   The manual therapy interventions were performed at a separate and distinct time from the therapeutic activities interventions.     Billed With/As:   [] TE   [] TA   [] Neuro   [] Self Care Patient Education: [x] Review HEP    [] Progressed/Changed HEP based on:   [] positioning   [] body mechanics   [] transfers   [] heat/ice application    [] other:      Other Objective/Functional Measures:         Post Treatment Pain Level (on 0 to 10) scale:   0  / 10     ASSESSMENT  Assessment/Changes in Function: See Progress note/Physician update for full detailed progress towards established goals. [x]  See Progress Note/Recertification   Patient will continue to benefit from skilled PT services to modify and progress therapeutic interventions, address functional mobility deficits, address ROM deficits, address strength deficits, analyze and address soft tissue restrictions, analyze and cue movement patterns, analyze and modify body mechanics/ergonomics, assess and modify postural abnormalities and instruct in home and community integration to attain remaining goals. Progress toward goals / Updated goals:  See Progress note/Physician update for full detailed progress towards established goals.      PLAN  [x]  Upgrade activities as tolerated yes Continue plan of care   []  Discharge due to :    []  Other:      Therapist: Daron Rush PTA    Date: 11/22/2021 Time: 3:37 PM     Future Appointments   Date Time Provider Narciso Hancock   11/22/2021  5:30 PM Shivani Carroll PTA [facility]   11/24/2021  3:45 PM Marilyn Xavier [address] [facility]   11/30/2021  5:30  [address]   12/2/2021  6:00  [address]   12/7/2021  6:00 PM Boaz Generous., PT [facility]   12/9/2021  6:00 PM Marilyn Xavier [facility]   12/14/2021  6:00 PM Boaz Generous., PT [facility]   12/16/2021  6:00 PM Marilyn Xavier [facility]   12/21/2021  6:00 PM Roderick Gave, PT [facility]   12/23/2021  6:00 PM Marilyn Xavier [facility]   12/28/2021  6:00 PM Boaz Generous., PT [facility]   12/30/2021  6:00 PM [facility]

## 2021-11-22 NOTE — PROGRESS NOTES
[address] [facility] PHYSICAL THERAPY  [address], [place], [address] - Phone: [phone]  Fax: [phone]  PROGRESS NOTE  Patient Name: Joseph Alonso : 1955   Treatment/Medical Diagnosis: Neck pain [M54.2]  Pain in right shoulder [M25.511]   Referral Source: Jodi Wylie DO     Date of Initial Visit: 10/28/21 Attended Visits: 8 Missed Visits: 0     SUMMARY OF TREATMENT  Therapeutic exercise for cervical, shoulder and scapular mobility, strengthening and RTC stabilization/endurance, manual therapy, patient education, cyrotherapy and HEP. CURRENT STATUS  Pt reports 10% overall improvement in sx since beginning care. Pt reports 5/10 max pain, 4/10 avg pain. Pain made worse with prolonged static positioning with R UE as well as with repetitive use and reaching type ADL's especially combined with rotation. Pt is making very slow limited progress with gaining mobility despite our best efforts with concentrating on extensive manual stretching/intervention over the past several visits. Improvements: Pt reports the most functional improvement with increased short term relief after therapy sessions and HEP before symptoms return to the same level as well as slight improvement with functional abilities. Remaining functional limitations: Increased limitations/pain/symptoms with prolonged static positioning with R UE as well as with repetitive use and reaching type ADL's especially combined with rotation. Objective Measurements:  Cervical AROM: Flexion= 40 deg; Extension= 58 deg; Side bending= R= 40 deg, L= 31 deg; Rotation= 61 degrees bilaterally   R shoulder AROM: Flexion= 108 deg; Abduction= 91 deg; ER= 12 deg (measured at approximately 45 degrees of abduction); IR= FIR to sacrum  R shoulder PROM: Flexion= 110 deg;  Abduction= 108 deg; ER= 12 deg; IR=32 deg (ER/IR PROM measured at approximately 30 degrees of scaption)   R shoulder strength measurements/MMT: Flexion= 4/5; Abduction= 4-/5; ER= 3+/5; IR= 5/5    FOTO 63/100    Goal/Measure of Progress Goal Met? 1. Pt will increase R shoulder PROM by >/= 15 deg in order to improve ease with dressing. Status at last Eval: Flexion= 107 deg (p!); Abduction= 80 deg (p!); ER= 11 deg; IR=30 deg (ER/IR PROM measured at approximately 30 deg scaption)  Current Status: Flexion= 110 deg; Abduction= 108 deg; ER= 12 deg; IR=32 deg (ER/IR PROM measured at approximately 30 degrees of scaption)  progress   2. Pt will demonstrate good supine cervical retraction for improved axial stability with sitting/standing ADL's. Status at last Eval: Fair- cervical retraction sitting/supine. Current Status: Fair to good cervical retraction in sitting/supine progress   3. Pt will improve FOTO score to >/= 62 to demo a significant improvement in functional activity tolerance. Status at last Eval: 49/100 Current Status: 63/100 yes     4. Pt will increase seated shoulder flexion to at least 130 for improved ease to reach to cabinets. Status at last Eval: SOC 90 deg with UT compensations and p! Current Status: 108 degrees with mild UT compensation  progress   5. Pt will increase R FIR to at least L5 for improved ease with dressing. Status at last Eval: FIR R to lateral glute Current Status: FIR to sacrum progress   6. Pt will increase b/l cervical SB to at least 40 deg for improved ease with sleeping. Status at last Eval: Lat flex R 15 (ipsilat neck p!), L 30 (ipsilat neck pain) Current Status: Lat flex R= 40 deg; L=31 deg Partially Met     New Goals to be achieved in __8__  treatments:  1. Pt will increase R shoulder PROM by >/= 15 deg in order to improve ease with dressing. 2. Pt will demonstrate good supine cervical retraction for improved axial stability with sitting/standing ADL's.  3. Pt will increase seated shoulder flexion to at least 130 for improved ease to reach to cabinets.    4.Pt will increase R FIR to at least L2 for improved ease with dressing. 5.Pt will increase L cervical SB to at least 40 deg for improved ease with sleeping. RECOMMENDATIONS  Continue with current POC for 8 additional visits with advancing as tolerated, then reassess for the need for continuation or discharge from therapy. Pt was advised to return to referring MD for further consultation as well as exploring other treatment options; question if possible benefit for injections to shoulder if in agreement with adhesive capsulitis diagnosis as per clinical presentation. If you have any questions/comments please contact us directly at [phone]. Thank you for allowing us to assist in the care of your patient. Therapist Signature: Neeraj Barksdale PTA Date: 11/22/2021    TERESITA Smith Time: 3:40 PM   NOTE TO PHYSICIAN:  PLEASE COMPLETE THE ORDERS BELOW AND FAX TO   [facility]: [phone]  If you are unable to process this request in 24 hours please contact our office: [phone]    ___ I have read the above report and request that my patient continue as recommended.   ___ I have read the above report and request that my patient continue therapy with the following changes/special instructions:_________________________________________________________   ___ I have read the above report and request that my patient be discharged from therapy.      Physician Signature:        Date:       Time:       Diana Sawyer DO

## 2021-11-24 ENCOUNTER — HOSPITAL ENCOUNTER (OUTPATIENT)
Dept: PHYSICAL THERAPY | Age: 66
Discharge: HOME | End: 2021-11-24
Payer: COMMERCIAL

## 2021-11-24 PROCEDURE — 97140 MANUAL THERAPY 1/> REGIONS: CPT

## 2021-11-24 PROCEDURE — 97110 THERAPEUTIC EXERCISES: CPT

## 2021-11-24 NOTE — PROGRESS NOTES
PHYSICAL THERAPY - DAILY TREATMENT NOTE    Patient Name: Jayson Aragon        Date: 2021  : 1955   yes Patient  Verified  Visit #:   9     Insurance: Payor: Maria Dolores Oiler / Plan: [employer] / Product Type: HMO /      In time: 3:53 Out time: 452   Total Treatment Time: 59     Medicare/[employer] Time Tracking (below)   Total Timed Codes (min):  49 1:1 Treatment Time:       TREATMENT AREA =  Neck pain [M54.2]  Pain in right shoulder [M25.511]    SUBJECTIVE  Pain Level (on 0 to 10 scale):  3 / 10   Medication Changes/New allergies or changes in medical history, any new surgeries or procedures?    no  If yes, update Summary List   Subjective Functional Status/Changes:  []  No changes reported     Pt reports the pain is up a little bit in his neck since the last visit. OBJECTIVE    34 min Therapeutic Exercise:  [x]  See flow sheet   Rationale:      increase ROM and increase strength to improve the patients ability to improve functional abilities      15 min Manual Therapy: sidelying scapular mobs, grade 3 posterior/inferior GH mobs and GH PROM all planes, prone GrIII t spine P/A mobilizations from T4-10   Rationale:      decrease pain, increase ROM, increase tissue extensibility, decrease trigger points and increase postural awareness to improve patient's ability to improve functional mobility   The manual therapy interventions were performed at a separate and distinct time from the therapeutic activities interventions. Billed With/As:   [] TE   [] TA   [] Neuro   [] Self Care Patient Education: [x] Review HEP    [] Progressed/Changed HEP based on:   [] positioning   [] body mechanics   [] transfers   [] heat/ice application    [] other:      Other Objective/Functional Measures:   Added corner hang and t-spine self mobilization, and open books  Added tricep dowel stretch   Post Treatment Pain Level (on 0 to 10) scale:   1  / 10     ASSESSMENT  Assessment/Changes in Function:   Corner hang was added with intention of improving passive shoulder ROM for improved ability to lift and reach overhead. T spine self mobilizations and open books were added with the intention of improving mid back mobility which will in turn contribute to improved neck and shoulder ROM. Tricep stretching was added as limited flexibility in triceps may also be contributing to dec shoulder flexion. [x]  See Progress Note/Recertification   Patient will continue to benefit from skilled PT services to modify and progress therapeutic interventions, address functional mobility deficits, address ROM deficits, address strength deficits, analyze and address soft tissue restrictions, analyze and cue movement patterns, analyze and modify body mechanics/ergonomics, assess and modify postural abnormalities and instruct in home and community integration to attain remaining goals. Progress toward goals / Updated goals:  1. Pt will increase R shoulder PROM by >/= 15 deg in order to improve ease with dressing. 2. Pt will demonstrate good supine cervical retraction for improved axial stability with sitting/standing ADL's.  3. Pt will increase seated shoulder flexion to at least 130 for improved ease to reach to cabinets. 4.Pt will increase R FIR to at least L2 for improved ease with dressing. 5.Pt will increase L cervical SB to at least 40 deg for improved ease with sleeping.      PLAN  [x]  Upgrade activities as tolerated yes Continue plan of care   []  Discharge due to :    []  Other:      Therapist: Tay Deras    Date: 11/24/2021 Time: 3:37 PM     Future Appointments   Date Time Provider Narciso Hancock   11/24/2021  3:45 PM Nacho, Usha1 Beronica Buckner [facility]   11/30/2021  5:30  [address]   12/2/2021  6:00  [address] [facility]   12/7/2021  6:00 PM Chino Cadena, PT [facility]   12/9/2021  6:00 PM Mayra Chau [facility]   12/14/2021  6:00 PM Chino Cadena, PT [facility]   12/16/2021  6:00 PM Nacho Panfilo Martinez [facility]   12/21/2021  6:00 PM Eric Hess PT [facility]   12/23/2021  6:00 PM Robert Collins [facility]   12/28/2021  6:00 PM Nils Wallace PT [facility]   12/30/2021  6:00 PM Robert Collins [facility]

## 2021-11-30 ENCOUNTER — HOSPITAL ENCOUNTER (OUTPATIENT)
Dept: PHYSICAL THERAPY | Age: 66
Discharge: HOME | End: 2021-11-30
Payer: COMMERCIAL

## 2021-11-30 PROCEDURE — 97110 THERAPEUTIC EXERCISES: CPT

## 2021-11-30 PROCEDURE — 97140 MANUAL THERAPY 1/> REGIONS: CPT

## 2021-11-30 NOTE — PROGRESS NOTES
PHYSICAL THERAPY - DAILY TREATMENT NOTE    Patient Name: Iona Valera        Date: 2021  : 1955   yes Patient  Verified  Visit #:   10     Insurance: Payor: Mana Recinos / Plan: [address] [place], [place] [address] [employer] / Product Type: HMO /      In time: 5:30 Out time: 635   Total Treatment Time: 65     Medicare/[facility] Time Tracking (below)   Total Timed Codes (min):  55 1:1 Treatment Time:       TREATMENT AREA =  Neck pain [M54.2]  Pain in right shoulder [M25.511]    SUBJECTIVE  Pain Level (on 0 to 10 scale): / 10   Medication Changes/New allergies or changes in medical history, any new surgeries or procedures?    no  If yes, update Summary List   Subjective Functional Status/Changes:  []  No changes reported     Pt reports he was noticeably sore for a couple days after the last session, he is sore in his elbow today, but overall back to baseline otherwise. He follows up with his MD tomorrow and is considering a cortisone shot.           OBJECTIVE  Modalities Rationale:     decrease inflammation and decrease pain to improve patient's ability to tolerate reaching overhead   min [] Estim, type/location:                                      []  att     []  unatt     []  w/US     []  w/ice    []  w/heat    min []  Mechanical Traction: type/lbs                   []  pro   []  sup   []  int   []  cont    []  before manual    []  after manual    min []  Ultrasound, settings/location:      min []  Iontophoresis w/ dexamethasone, location:                                               []  take home patch       []  in clinic   10 min [x]  Ice     []  Heat    location/position: To R shoulder and elbow in seated    min []  Vasopneumatic Device, press/temp:    If using vaso (only need to measure limb vaso being performed on)      pre-treatment girth :       post-treatment girth :       measured at (landmark location) :      min []  Other:    [x] Skin assessment post-treatment (if applicable):    [x]  intact    []  redness- no adverse reaction                  []redness  adverse reaction:        40 min Therapeutic Exercise:  [x]  See flow sheet   Rationale:      increase ROM and increase strength to improve the patients ability to improve functional abilities      15 min Manual Therapy: grade 3 posterior/inferior GH mobs and GH PROM all planes, prone GrIII t spine P/A mobilizations from T4-10   Rationale:      decrease pain, increase ROM, increase tissue extensibility, decrease trigger points and increase postural awareness to improve patient's ability to improve functional mobility   The manual therapy interventions were performed at a separate and distinct time from the therapeutic activities interventions. Billed With/As:   [] TE   [] TA   [] Neuro   [] Self Care Patient Education: [x] Review HEP    [] Progressed/Changed HEP based on:   [] positioning   [] body mechanics   [] transfers   [] heat/ice application    [] other:      Other Objective/Functional Measures: Added maria victoria pose/ quadruped rock back  Updated HEP   Post Treatment Pain Level (on 0 to 10) scale:   1  / 10     ASSESSMENT  Assessment/Changes in Function:   Continued with progressions from last visit in addition to quadruped rock back/maria victoria pose with intention of progressing functional shoulder ROM for improved ability to reach overhead. Pt required vc and demo with the exercise to perform correctly and exercise was modified to WB through elbows vs hands to dec mm guarding. Pt was also instructed in seated vs supine thoracic self mobilization with a foam roller to intensify stretch. Pt HEP was updated to reflect most recent exercise additions. See chart for copy.        [x]  See Progress Note/Recertification   Patient will continue to benefit from skilled PT services to modify and progress therapeutic interventions, address functional mobility deficits, address ROM deficits, address strength deficits, analyze and address soft tissue restrictions, analyze and cue movement patterns, analyze and modify body mechanics/ergonomics, assess and modify postural abnormalities and instruct in home and community integration to attain remaining goals. Progress toward goals / Updated goals:  1. Pt will increase R shoulder PROM by >/= 15 deg in order to improve ease with dressing. 11/30/21 pt demonstrates 120 deg R shoulder flexion PROM post exercise and manual therapy. 2. Pt will demonstrate good supine cervical retraction for improved axial stability with sitting/standing ADL's.  3. Pt will increase seated shoulder flexion to at least 130 for improved ease to reach to cabinets. 4.Pt will increase R FIR to at least L2 for improved ease with dressing. 5.Pt will increase L cervical SB to at least 40 deg for improved ease with sleeping.      PLAN  [x]  Upgrade activities as tolerated yes Continue plan of care   []  Discharge due to :    []  Other:      Therapist: Otto Arauz    Date: 11/30/2021 Time: 3:37 PM     Future Appointments   Date Time Provider Narciso Hancock   11/30/2021  5:30 PM Lbua Maradiaga [facility]   12/2/2021  6:00 PM [address] [place] [address] [address]   12/7/2021  6:00 PM Lorean Ort., PT [facility]   12/9/2021  6:00 PM Luba Maradiaga [facility]   12/14/2021  6:00 PM Lorean Ort., PT [facility]   12/16/2021  6:00 PM Luba Case [facility]   12/21/2021  6:00 PM Velma Cordon, PT [facility]   12/23/2021  6:00 PM Luba Maradiaga [facility]   12/28/2021  6:00 PM Lorean Ort., PT [facility]   12/30/2021  6:00 PM Luba Case [facility]

## 2021-12-02 ENCOUNTER — HOSPITAL ENCOUNTER (OUTPATIENT)
Dept: PHYSICAL THERAPY | Age: 66
Discharge: HOME | End: 2021-12-02
Payer: COMMERCIAL

## 2021-12-02 PROCEDURE — 97140 MANUAL THERAPY 1/> REGIONS: CPT

## 2021-12-02 PROCEDURE — 97110 THERAPEUTIC EXERCISES: CPT

## 2021-12-02 NOTE — PROGRESS NOTES
PHYSICAL THERAPY - DAILY TREATMENT NOTE    Patient Name: Katheryn Bueno        Date: 2021  : 1955   yes Patient  Verified  Visit #:     Insurance: Payor: Thony Venegas / Plan: [employer] HMO / Product Type: HMO /      In time: 5:50 Out time: 7:05   Total Treatment Time: 75     Medicare/BCBS Time Tracking (below)   Total Timed Codes (min):  65 1:1 Treatment Time:       TREATMENT AREA =  Neck pain [M54.2]  Pain in right shoulder [M25.511]    SUBJECTIVE  Pain Level (on 0 to 10 scale): 2/ 10   Medication Changes/New allergies or changes in medical history, any new surgeries or procedures?    no  If yes, update Summary List   Subjective Functional Status/Changes:  []  No changes reported     Pt reports he saw Dr Salvador Tse at [facility] about his shoulder yesterday. He got a cortisone injection, and his shoulder is a bit sore from that, but overall its doing fine.          OBJECTIVE  Modalities Rationale:     decrease inflammation and decrease pain to improve patient's ability to tolerate reaching overhead   min [] Estim, type/location:                                      []  att     []  unatt     []  w/US     []  w/ice    []  w/heat    min []  Mechanical Traction: type/lbs                   []  pro   []  sup   []  int   []  cont    []  before manual    []  after manual    min []  Ultrasound, settings/location:      min []  Iontophoresis w/ dexamethasone, location:                                               []  take home patch       []  in clinic   10 min [x]  Ice     []  Heat    location/position: To R shoulder in seated    min []  Vasopneumatic Device, press/temp:    If using vaso (only need to measure limb vaso being performed on)      pre-treatment girth :       post-treatment girth :       measured at (landmark location) :      min []  Other:    [x] Skin assessment post-treatment (if applicable):    [x]  intact    []  redness- no adverse reaction                  []redness - adverse reaction: 46 min Therapeutic Exercise:  [x]  See flow sheet   Rationale:      increase ROM and increase strength to improve the patients ability to improve functional abilities      19 min Manual Therapy: grade 3 posterior/inferior GH mobs and GH PROM all planes, sidelying scapular distraction followed by subscapularis release. Rationale:      decrease pain, increase ROM, increase tissue extensibility, decrease trigger points and increase postural awareness to improve patient's ability to improve functional mobility   The manual therapy interventions were performed at a separate and distinct time from the therapeutic activities interventions. Billed With/As:   [] TE   [] TA   [] Neuro   [] Self Care Patient Education: [x] Review HEP    [] Progressed/Changed HEP based on:   [] positioning   [] body mechanics   [] transfers   [] heat/ice application    [] other:      Other Objective/Functional Measures  Added sleeper stretch,    Post Treatment Pain Level (on 0 to 10) scale:   1  / 10     ASSESSMENT  Assessment/Changes in Function:   Pt was able to tolerate sleeper stretch today with intention of improving shoulder internal rotation. Pt returned to dowel stretches today with intention of continuing to improve shoulder mobility for ADLs. Pt required min vc with familiar exercises to perform correctly. Pt reported no inc in sx with new exercises today. [x]  See Progress Note/Recertification   Patient will continue to benefit from skilled PT services to modify and progress therapeutic interventions, address functional mobility deficits, address ROM deficits, address strength deficits, analyze and address soft tissue restrictions, analyze and cue movement patterns, analyze and modify body mechanics/ergonomics, assess and modify postural abnormalities and instruct in home and community integration to attain remaining goals. Progress toward goals / Updated goals:  1.  Pt will increase R shoulder PROM by >/= 15 deg in order to improve ease with dressing. 11/30/21 pt demonstrates 120 deg R shoulder flexion PROM post exercise and manual therapy. 2. Pt will demonstrate good supine cervical retraction for improved axial stability with sitting/standing ADL's.  3. Pt will increase seated shoulder flexion to at least 130 for improved ease to reach to cabinets. 4.Pt will increase R FIR to at least L2 for improved ease with dressing. 5.Pt will increase L cervical SB to at least 40 deg for improved ease with sleeping.      PLAN  [x]  Upgrade activities as tolerated yes Continue plan of care   []  Discharge due to :    []  Other:      Therapist: Fernandez Daniel    Date: 12/2/2021 Time: 3:37 PM     Future Appointments   Date Time Provider Nraciso Hancock   12/2/2021  6:00 PM Suhail Greene [facility]   12/7/2021  6:00 PM Boaz Generous., PT [facility]   12/9/2021  6:00 PM Marilyn Xavier [facility]   12/14/2021  6:00 PM Boaz Generous., PT [facility]   12/16/2021  6:00 PM Marilyn Xavier [facility]   12/21/2021  6:00 PM Roderick Gave, PT [facility]   12/23/2021  6:00 PM Marilyn Xavier [facility]   12/28/2021  6:00 PM Boaz Generous., PT [facility]   12/30/2021  6:00 PM Marilyn Xavier [facility]

## 2021-12-07 ENCOUNTER — HOSPITAL ENCOUNTER (OUTPATIENT)
Dept: PHYSICAL THERAPY | Age: 66
Discharge: HOME | End: 2021-12-07
Payer: COMMERCIAL

## 2021-12-07 PROCEDURE — 97140 MANUAL THERAPY 1/> REGIONS: CPT

## 2021-12-07 PROCEDURE — 97110 THERAPEUTIC EXERCISES: CPT

## 2021-12-07 NOTE — PROGRESS NOTES
PHYSICAL THERAPY - DAILY TREATMENT NOTE    Patient Name: Erica Alt        Date: 2021  : 1955   yes Patient  Verified  Visit #:     Insurance: Payor: Miguelina Randa / Plan: [address] [place] [address] [place] HMO / Product Type: HMO /      In time: 5:57 Out time: 7:01   Total Treatment Time: 64     Medicare/BCBS Time Tracking (below)   Total Timed Codes (min):  50 1:1 Treatment Time:  n/a     TREATMENT AREA =  Neck pain [M54.2]  Pain in right shoulder [M25.511]    SUBJECTIVE  Pain Level (on 0 to 10 scale): 1 / 10   Medication Changes/New allergies or changes in medical history, any new surgeries or procedures?    no  If yes, update Summary List   Subjective Functional Status/Changes:  []  No changes reported     Pt reports his pain is significantly improved since the cortisone injection; \"it's definitely made moving easier,\" Still has pain and limitation with FIR.        OBJECTIVE  Modalities Rationale:     decrease inflammation and decrease pain to improve patient's ability to tolerate reaching overhead   min [] Estim, type/location:                                      []  att     []  unatt     []  w/US     []  w/ice    []  w/heat    min []  Mechanical Traction: type/lbs                   []  pro   []  sup   []  int   []  cont    []  before manual    []  after manual    min []  Ultrasound, settings/location:      min []  Iontophoresis w/ dexamethasone, location:                                               []  take home patch       []  in clinic   10 min [x]  Ice     []  Heat    location/position: To R shoulder in seated    min []  Vasopneumatic Device, press/temp:    If using vaso (only need to measure limb vaso being performed on)      pre-treatment girth :       post-treatment girth :       measured at (landmark location) :      min []  Other:    [x] Skin assessment post-treatment (if applicable):    [x]  intact    []  redness- no adverse reaction                  []redness - adverse reaction:        38 min Therapeutic Exercise:  [x]  See flow sheet (-4 min UBE)   Rationale:      increase ROM and increase strength to improve the patients ability to improve functional abilities      12 min Manual Therapy: grade 3/4 posterior/inferior/lateral distraction [facility]. [address] PROM all planes. Manual pec stretching   Rationale:      decrease pain, increase ROM, increase tissue extensibility, decrease trigger points and increase postural awareness to improve patient's ability to improve functional mobility   The manual therapy interventions were performed at a separate and distinct time from the therapeutic activities interventions. Billed With/As:   [x] TE   [] TA   [] Neuro   [] Self Care Patient Education: [x] Review HEP    [] Progressed/Changed HEP based on:   [] positioning   [] body mechanics   [] transfers   [] heat/ice application    [] other:      Other Objective/Functional Measures  -modified triceps stretch with dowel to lats stretch with dowel   Post Treatment Pain Level (on 0 to 10) scale:   1  / 10     ASSESSMENT  Assessment/Changes in Function:   Significant tightness to pecs, lats and anterior>inferior joint capsule limiting shoulder mobility; addressed with manual therapy and slight improvement in PROM noted afterwards. Pt with good form performing self stretching and encouraged to continue with HEP. Anticipate benefit from cortisone injection will allow for further gains with shoulder mobility which will continue to be emphasis at this time due to ongoing significant restrictions.      [x]  See Progress Note/Recertification   Patient will continue to benefit from skilled PT services to modify and progress therapeutic interventions, address functional mobility deficits, address ROM deficits, address strength deficits, analyze and address soft tissue restrictions, analyze and cue movement patterns, analyze and modify body mechanics/ergonomics, assess and modify postural abnormalities and instruct in home and community integration to attain remaining goals. Progress toward goals / Updated goals:  1. Pt will increase R shoulder PROM by >/= 15 deg in order to improve ease with dressing. 11/30/21 pt demonstrates 120 deg R shoulder flexion PROM post exercise and manual therapy. 2. Pt will demonstrate good supine cervical retraction for improved axial stability with sitting/standing ADL's.-12/7: goal in progress; plan to initiate at [place]  3. Pt will increase seated shoulder flexion to at least 130 for improved ease to reach to cabinets. 4.Pt will increase R FIR to at least L2 for improved ease with dressing. 5.Pt will increase L cervical SB to at least 40 deg for improved ease with sleeping. PLAN  [x]  Upgrade activities as tolerated yes Continue plan of care   []  Discharge due to :    []  Other:      Therapist: Maldonado Mooney.  Jared Buckley, PT    Date: 12/7/2021 Time: 6:56 PM      Future Appointments   Date Time Provider Narciso Hancock   12/7/2021  6:00 PM Armando Weir., PT [facility]   12/9/2021  6:00 PM Harshil Grates [facility]   12/14/2021  6:00 PM Armando Weir., PT [facility]   12/16/2021  6:00 PM Harshil Grates [facility]   12/21/2021  6:00 PM Subhash Dumont, PT [facility]   12/23/2021  6:00 PM Harshil Grates [facility]   12/28/2021  6:00 PM Armando Weir., PT [facility]   12/30/2021  6:00 PM Harshil Grates [facility]

## 2021-12-09 ENCOUNTER — HOSPITAL ENCOUNTER (OUTPATIENT)
Dept: PHYSICAL THERAPY | Age: 66
Discharge: HOME | End: 2021-12-09
Payer: COMMERCIAL

## 2021-12-09 PROCEDURE — 97110 THERAPEUTIC EXERCISES: CPT

## 2021-12-09 PROCEDURE — 97140 MANUAL THERAPY 1/> REGIONS: CPT

## 2021-12-09 NOTE — PROGRESS NOTES
PHYSICAL THERAPY - DAILY TREATMENT NOTE    Patient Name: Elizebeth Soulier        Date: 2021  : 1955   yes Patient  Verified  Visit #:   13     Insurance: Payor: Jose Cruz Villeda / Plan: Loida Riggins HMO / Product Type: HMO /      In time: 600 Out time: 7:04   Total Treatment Time: 64     Medicare/BCBS Time Tracking (below)   Total Timed Codes (min):  54 1:1 Treatment Time:  n/a     TREATMENT AREA =  Neck pain [M54.2]  Pain in right shoulder [M25.511]    SUBJECTIVE  Pain Level (on 0 to 10 scale): 2 / 10   Medication Changes/New allergies or changes in medical history, any new surgeries or procedures?    no  If yes, update Summary List   Subjective Functional Status/Changes:  []  No changes reported     Pt reports his shoulder is stiff today, it is getting more flexible, he has been able to reach the top of the door frame at home.         OBJECTIVE  Modalities Rationale:     decrease inflammation and decrease pain to improve patient's ability to tolerate reaching overhead   min [] Estim, type/location:                                      []  att     []  unatt     []  w/US     []  w/ice    []  w/heat    min []  Mechanical Traction: type/lbs                   []  pro   []  sup   []  int   []  cont    []  before manual    []  after manual    min []  Ultrasound, settings/location:      min []  Iontophoresis w/ dexamethasone, location:                                               []  take home patch       []  in clinic   10 min [x]  Ice     [x]  Heat    location/position: To R shoulder in seated    min []  Vasopneumatic Device, press/temp:    If using vaso (only need to measure limb vaso being performed on)      pre-treatment girth :       post-treatment girth :       measured at (landmark location) :      min []  Other:    [x] Skin assessment post-treatment (if applicable):    [x]  intact    []  redness- no adverse reaction                  []redness - adverse reaction:        39 min Therapeutic Exercise: [x]  See flow sheet (-4 min UBE)   Rationale:      increase ROM and increase strength to improve the patients ability to improve functional abilities      15 min Manual Therapy: grade 3/4 posterior/inferior/lateral distraction  [employer]. [address] [facility] PROM all planes. Manual pec stretching, sub scap release   Rationale:      decrease pain, increase ROM, increase tissue extensibility, decrease trigger points and increase postural awareness to improve patient's ability to improve functional mobility   The manual therapy interventions were performed at a separate and distinct time from the therapeutic activities interventions. Billed With/As:   [x] TE   [] TA   [] Neuro   [] Self Care Patient Education: [x] Review HEP    [] Progressed/Changed HEP based on:   [] positioning   [] body mechanics   [] transfers   [] heat/ice application    [] other:      Other Objective/Functional Measures  Added shoulder extension stretching at counter, wall slides( flexion and flexion to eccentric abd)    Post Treatment Pain Level (on 0 to 10) scale:   1  / 10     ASSESSMENT  Assessment/Changes in Function:   Pt responded well to new stretches and exercises reporting no significant inc in sx with new exercises. Pt demonstrates improvements in shoulder ROM ( FIR with strap to ~L3) which will likely contribute to improved ability to perform ADLs. Wall wipes were added to improve pt functional strength through available ROM for improved ability to lift overhead. [x]  See Progress Note/Recertification   Patient will continue to benefit from skilled PT services to modify and progress therapeutic interventions, address functional mobility deficits, address ROM deficits, address strength deficits, analyze and address soft tissue restrictions, analyze and cue movement patterns, analyze and modify body mechanics/ergonomics, assess and modify postural abnormalities and instruct in home and community integration to attain remaining goals.    Progress toward goals / Updated goals:  1. Pt will increase R shoulder PROM by >/= 15 deg in order to improve ease with dressing. 11/30/21 pt demonstrates 120 deg R shoulder flexion PROM post exercise and manual therapy. 2. Pt will demonstrate good supine cervical retraction for improved axial stability with sitting/standing ADL's.-12/9 initiated chin tucks today, goal in progress  3. Pt will increase seated shoulder flexion to at least 130 for improved ease to reach to cabinets. 4.Pt will increase R FIR to at least L2 for improved ease with dressing. 5.Pt will increase L cervical SB to at least 40 deg for improved ease with sleeping.      PLAN  [x]  Upgrade activities as tolerated yes Continue plan of care   []  Discharge due to :    []  Other:      Therapist: Mervin Schirmer    Date: 12/9/2021 Time: 6:56 PM      Future Appointments   Date Time Provider Narciso Hancock   12/9/2021  6:00 PM Cam Flair [facility]   12/14/2021  6:00 PM Timoteo Valera., PT [facility]   12/16/2021  6:00 PM Brittany Gee [facility]   12/21/2021  6:00 PM Mike Doran, PT [facility]   12/23/2021  6:00 PM Brittany Gee [facility]   12/28/2021  6:00 PM Timoteo Pabon, PT [facility]   12/30/2021  6:00 PM Brittany Gee [facility]

## 2021-12-14 ENCOUNTER — HOSPITAL ENCOUNTER (OUTPATIENT)
Dept: PHYSICAL THERAPY | Age: 66
Discharge: HOME | End: 2021-12-14
Payer: COMMERCIAL

## 2021-12-14 PROCEDURE — 97110 THERAPEUTIC EXERCISES: CPT

## 2021-12-14 PROCEDURE — 97140 MANUAL THERAPY 1/> REGIONS: CPT

## 2021-12-14 NOTE — PROGRESS NOTES
PHYSICAL THERAPY - DAILY TREATMENT NOTE    Patient Name: Daphne Peña        Date: 2021  : 1955   yes Patient  Verified  Visit #:   14     Insurance: Payor: Stefan Pickering / Plan: [employer] HMO / Product Type: HMO /      In time: 5:58 Out time: 6:58   Total Treatment Time: 60     Medicare/BCBS Time Tracking (below)   Total Timed Codes (min):  46 1:1 Treatment Time:  n/a     TREATMENT AREA =  Neck pain [M54.2]  Pain in right shoulder [M25.511]    SUBJECTIVE  Pain Level (on 0 to 10 scale): 1 / 10   Medication Changes/New allergies or changes in medical history, any new surgeries or procedures?    no  If yes, update Summary List   Subjective Functional Status/Changes:  []  No changes reported     Pt reports relatively low level constant pain recently. Continues to perform HEP stretches regularly.        OBJECTIVE  Modalities Rationale:     decrease inflammation and decrease pain to improve patient's ability to tolerate reaching overhead   min [] Estim, type/location:                                      []  att     []  unatt     []  w/US     []  w/ice    []  w/heat    min []  Mechanical Traction: type/lbs                   []  pro   []  sup   []  int   []  cont    []  before manual    []  after manual    min []  Ultrasound, settings/location:      min []  Iontophoresis w/ dexamethasone, location:                                               []  take home patch       []  in clinic   10 min [x]  Ice     []  Heat    location/position: To R shoulder in seated    min []  Vasopneumatic Device, press/temp:        min []  Other:    [x] Skin assessment post-treatment (if applicable):    [x]  intact    []  redness- no adverse reaction                  []redness - adverse reaction:        36 min Therapeutic Exercise:  [x]  See flow sheet (-4 min UBE)   Rationale:      increase ROM and increase strength to improve the patients ability to improve functional abilities      10 min Manual Therapy: grade 3/4 posterior/inferior/lateral distraction [facility]. [address] PROM all planes. Rationale:      decrease pain, increase ROM, increase tissue extensibility, decrease trigger points and increase postural awareness to improve patient's ability to improve functional mobility   The manual therapy interventions were performed at a separate and distinct time from the therapeutic activities interventions. Billed With/As:   [x] TE   [] TA   [] Neuro   [] Self Care Patient Education: [x] Review HEP    [] Progressed/Changed HEP based on:   [] positioning   [] body mechanics   [] transfers   [] heat/ice application    [] other:      Other Objective/Functional Measures  FIR to L3 with strap stretch  -modified dowel ER stretch to standing at door   Post Treatment Pain Level (on 0 to 10) scale:   0  / 10     ASSESSMENT  Assessment/Changes in Function:   Pt demonstrates good improvement in FIR today as per increase to L3 vs last assessment on 11/22 pt was at sacrum. Performed aggressive shoulder stretching all planes manually today to increase ROM; pt not noting significant increase in pain/soreness afterwards. Discussed with pt plan for formal reassessment in 1-2 sessions with either recommendation to continue with HEP vs extension of PT depending on need for/benefit from manual stretching. [x]  See Progress Note/Recertification   Patient will continue to benefit from skilled PT services to modify and progress therapeutic interventions, address functional mobility deficits, address ROM deficits, address strength deficits, analyze and address soft tissue restrictions, analyze and cue movement patterns, analyze and modify body mechanics/ergonomics, assess and modify postural abnormalities and instruct in home and community integration to attain remaining goals. Progress toward goals / Updated goals:    1. Pt will increase R shoulder PROM by >/= 15 deg in order to improve ease with dressing.  11/30/21 pt demonstrates 120 deg R shoulder flexion PROM post exercise and manual therapy. 2. Pt will demonstrate good supine cervical retraction for improved axial stability with sitting/standing ADL's.-12/9 initiated chin tucks today, goal in progress  3. Pt will increase seated shoulder flexion to at least 130 for improved ease to reach to cabinets. 4.Pt will increase R FIR to at least L2 for improved ease with dressing. -12/14: goal in progress; FIR to L3 with strap stretching  5. Pt will increase L cervical SB to at least 40 deg for improved ease with sleeping. PLAN  [x]  Upgrade activities as tolerated yes Continue plan of care   []  Discharge due to :    []  Other:      Therapist: Sony Pillai.  Cecilia Leal, PT    Date: 12/14/2021 Time: 7:01 PM      Future Appointments   Date Time Provider Narciso Hancock   12/14/2021  6:00 PM Valarie Munson, PT [facility]   12/16/2021  6:00 PM Sami Deras [facility]   12/21/2021  6:00 PM Julissa Deluna, PT [facility]   12/23/2021  6:00 PM Sami Deras [facility]   12/28/2021  6:00 PM Valarie Munson, PT [facility]   12/30/2021  6:00 PM Sami Deras [facility]

## 2021-12-16 ENCOUNTER — HOSPITAL ENCOUNTER (OUTPATIENT)
Dept: PHYSICAL THERAPY | Age: 66
Discharge: HOME | End: 2021-12-16
Payer: COMMERCIAL

## 2021-12-16 PROCEDURE — 97140 MANUAL THERAPY 1/> REGIONS: CPT

## 2021-12-16 PROCEDURE — 97110 THERAPEUTIC EXERCISES: CPT

## 2021-12-16 NOTE — PROGRESS NOTES
PHYSICAL THERAPY - DAILY TREATMENT NOTE    Patient Name: Franck De Los Santos        Date: 2021  : 1955   yes Patient  Verified  Visit #:   15     Insurance: Payor: [employer] / Plan: [employer] HMO / Product Type: HMO /      In time: 5:55 Out time: 6:56   Total Treatment Time: 61     Medicare/BCBS Time Tracking (below)   Total Timed Codes (min):  51 1:1 Treatment Time:  n/a     TREATMENT AREA =  Neck pain [M54.2]  Pain in right shoulder [M25.511]    SUBJECTIVE  Pain Level (on 0 to 10 scale): 0/ 10   Medication Changes/New allergies or changes in medical history, any new surgeries or procedures?    no  If yes, update Summary List   Subjective Functional Status/Changes:  []  No changes reported     Pt reports no pain in neck or shoulder today , he was not sore after his last session.       OBJECTIVE  Modalities Rationale:     decrease inflammation and decrease pain to improve patient's ability to tolerate reaching overhead   min [] Estim, type/location:                                      []  att     []  unatt     []  w/US     []  w/ice    []  w/heat    min []  Mechanical Traction: type/lbs                   []  pro   []  sup   []  int   []  cont    []  before manual    []  after manual    min []  Ultrasound, settings/location:      min []  Iontophoresis w/ dexamethasone, location:                                               []  take home patch       []  in clinic   10 min [x]  Ice     []  Heat    location/position: To R shoulder in seated    min []  Vasopneumatic Device, press/temp:        min []  Other:    [x] Skin assessment post-treatment (if applicable):    [x]  intact    []  redness- no adverse reaction                  []redness - adverse reaction:        36 min Therapeutic Exercise:  [x]  See flow sheet (-4 min UBE)   Rationale:      increase ROM and increase strength to improve the patients ability to improve functional abilities      15 min Manual Therapy: grade 3/4 posterior/inferior/anterior  [facility] mobs. [facility] PROM all planes. Pec and subscap release. Rationale:      decrease pain, increase ROM, increase tissue extensibility, decrease trigger points and increase postural awareness to improve patient's ability to improve functional mobility   The manual therapy interventions were performed at a separate and distinct time from the therapeutic activities interventions. Billed With/As:   [x] TE   [] TA   [] Neuro   [] Self Care Patient Education: [x] Review HEP    [] Progressed/Changed HEP based on:   [] positioning   [] body mechanics   [] transfers   [] heat/ice application    [] other:      Other Objective/Functional Measures  Added sidelying abd  Supine shoulder flexion hang stretch with dowel and wrist weight    Cervical AROM: Flexion= 40 deg; Extension= 55 deg; Side bending= R= 40 deg, L= 35 deg; Rotation= 65 degrees bilaterally   R shoulder AROM: Flexion= 120 deg; Abduction= 109 deg; ER= 15 deg; IR= FIR to L3 with strap  R shoulder PROM: Flexion= 130 deg; Abduction= 123 deg; ER= 23 deg; IR=57 deg (ER/IR PROM measured at approximately 30 degrees of scaption)   R shoulder strength measurements/MMT: Flexion= 4/5; Abduction= 4-/5; ER= 3+/5; IR= 5/5 (not tested 12/16/21)     Post Treatment Pain Level (on 0 to 10) scale:   0  / 10     ASSESSMENT  Assessment/Changes in Function:   Pt was able to tolerate progression of wall abduction slides to wall abduction slide with eccentric flexion lowering. Pt was also able to tolerate sidelying abduction and external rotation evidencing improving strength. Pt was instructed in supine flexion hang with #3 wrist weight, utilizing dowel to aid in placing shoulder into end range position. Pt continues to respond well to manual therapy demonstrating improvements in ROM following tx.       [x]  See Progress Note/Recertification   Patient will continue to benefit from skilled PT services to modify and progress therapeutic interventions, address functional mobility deficits, address ROM deficits, address strength deficits, analyze and address soft tissue restrictions, analyze and cue movement patterns, analyze and modify body mechanics/ergonomics, assess and modify postural abnormalities and instruct in home and community integration to attain remaining goals. Progress toward goals / Updated goals:  1. Pt will increase R shoulder PROM by >/= 15 deg in order to improve ease with dressing. 11/30/21 pt demonstrates 120 deg R shoulder flexion PROM post exercise and manual therapy. 2. Pt will demonstrate good supine cervical retraction for improved axial stability with sitting/standing ADL's.-12/9 initiated chin tucks today, goal in progress  3. Pt will increase seated shoulder flexion to at least 130 for improved ease to reach to cabinets. 4.Pt will increase R FIR to at least L2 for improved ease with dressing. -12/14: goal in progress; FIR to L3 with strap stretching  5. Pt will increase L cervical SB to at least 40 deg for improved ease with sleeping.      PLAN  [x]  Upgrade activities as tolerated yes Continue plan of care   []  Discharge due to :    []  Other:      Therapist: Ce Grey    Date: 12/16/2021 Time: 7:01 PM      Future Appointments   Date Time Provider Narciso Hancock   12/16/2021  6:00 PM Sherri Saez [facility]   12/21/2021  6:00 PM Harper Brambila, PT [facility]   12/23/2021  6:00 PM Martínez Kemp [facility]   12/28/2021  6:00 PM Danette Morgan, PT [facility]   12/30/2021  6:00 PM Martínez Kemp [facility]

## 2021-12-21 ENCOUNTER — HOSPITAL ENCOUNTER (OUTPATIENT)
Dept: PHYSICAL THERAPY | Age: 66
Discharge: HOME | End: 2021-12-21
Payer: COMMERCIAL

## 2021-12-21 PROCEDURE — 97140 MANUAL THERAPY 1/> REGIONS: CPT | Performed by: GENERAL ACUTE CARE HOSPITAL

## 2021-12-21 PROCEDURE — 97110 THERAPEUTIC EXERCISES: CPT | Performed by: GENERAL ACUTE CARE HOSPITAL

## 2021-12-21 NOTE — PROGRESS NOTES
[facility] PHYSICAL THERAPY  [address], [place], [address] - Phone: [phone]  Fax: [phone]  PROGRESS NOTE  Patient Name: Katheryn Bueno : 1955   Treatment/Medical Diagnosis: Neck pain [M54.2]  Pain in right shoulder [M25.511]   Referral Source: Preston Acuan DO     Date of Initial Visit: 10/28/21 Attended Visits: 16 Missed Visits: 0     SUMMARY OF TREATMENT  Therapeutic exercise for cervical, shoulder and scapular mobility, strengthening and RTC stabilization/endurance, manual therapy, patient education, cyrotherapy and HEP. CURRENT STATUS  Patient is making excellent progress at this, reporting 75% improvement in overall functional mobility and pain. Pt continues to be most limited by shoulder elevation and ER ROM at this time, but making steady improvements each week. Pt has notable weakness in shoulder external rotators. Pt is motivated and compliant with HEP making him a great candidate to continue PT in order to further restore shoulder/scapular ROM and strength. Assessment as follows:     Subjective % improvement: 75%  Improvements: improved ROM - reaching overhead, reaching behind back  Deficits: combing back of head, lack of strength   Cervical AROM: Flexion= 40 deg; Extension= 55 deg; Side bending= R= 40 deg, L= 35 deg; Rotation= 65 degrees bilaterally   R shoulder AROM: Flexion= 125 deg; Abduction= 115 deg; ER= 15 deg; IR= FIR to L3, GREGORIA to back of head but compensated  R shoulder PROM: Flexion= 130 deg; Abduction= 123 deg; ER= 23 deg; IR=57 deg (ER/IR PROM measured at approximately 30 degrees of scaption)    R shoulder strength measurements/MMT: Flexion= 4+/5; Abduction= 4/5; ER= 3+/5; IR= 5/5     Progress toward goals / Updated goals:  1. Pt will increase R shoulder PROM by >/= 15 deg in order to improve ease with dressing.  Current: met - increased by 20 degrees from last assessment (21)  2. Pt will demonstrate good supine cervical retraction for improved axial stability with sitting/standing ADL's. Current: progressing - fair performance in standing , requires VC's (12/21/21)  3. Pt will increase seated shoulder flexion to at least 130 for improved ease to reach to cabinets. Current: progressing - flexion AROM to 120 deg (12/21/21)  4. Pt will increase R FIR to at least L2 for improved ease with dressing. Current: progressing: met - FIR AROM to L3 (12/21/21)  5. Pt will increase L cervical SB to at least 40 deg for improved ease with sleeping. Current: nearly met - R 40 , L 35  (12/21/21)      New Goals to be achieved in __8-10__  treatments:  1. Pt will demo R shoulder flexion AROM >140 degrees in order to reach overhead cabinets with ease  2. Pt will demo R shoulder GREGORIA to T1 for ease of grooming ADL's  3. Pt will demo R shoulder ER strength to >4/5 for improved stabilization for reaching and lifting       RECOMMENDATIONS  Cont 2x/wk for an additional 8-10 sessions to address remaining impairments   If you have any questions/comments please contact us directly at [phone]. Thank you for allowing us to assist in the care of your patient. Therapist Signature: Colette Rinne, PT Date: 12/21/2021     Time: 2:38 PM   NOTE TO PHYSICIAN:  PLEASE COMPLETE THE ORDERS BELOW AND FAX TO   [facility] Physical Therapy: [phone]  If you are unable to process this request in 24 hours please contact our office: [phone]    ___ I have read the above report and request that my patient continue as recommended.   ___ I have read the above report and request that my patient continue therapy with the following changes/special instructions:_________________________________________________________   ___ I have read the above report and request that my patient be discharged from therapy.      Physician Signature:        Date:       Time:       Susan Posada DO

## 2021-12-21 NOTE — PROGRESS NOTES
PHYSICAL THERAPY - DAILY TREATMENT NOTE    Patient Name: Epi Mail        Date: 2021  : 1955   yes Patient  Verified  Visit #:   16     Insurance: Payor: [facility] / Plan: [address] [place] HMO / Product Type: HMO /      In time: 6:02  Out time: 6:53   Total Treatment Time: 51     Medicare/BCBS Time Tracking (below)   Total Timed Codes (min):  51  1:1 Treatment Time:  n/a     TREATMENT AREA =  Neck pain [M54.2]  Pain in right shoulder [M25.511]    SUBJECTIVE  Pain Level (on 0 to 10 scale): 0/ 10     Medication Changes/New allergies or changes in medical history, any new surgeries or procedures?    no  If yes, update Summary List   Subjective Functional Status/Changes:  []  No changes reported     Pt reports no pain today, feels he is progressing well. OBJECTIVE  Modalities Rationale:     decrease inflammation and decrease pain to improve patient's ability to tolerate reaching overhead   min [] Estim, type/location:                                      []  att     []  unatt     []  w/US     []  w/ice    []  w/heat    min []  Mechanical Traction: type/lbs                   []  pro   []  sup   []  int   []  cont    []  before manual    []  after manual    min []  Ultrasound, settings/location:      min []  Iontophoresis w/ dexamethasone, location:                                               []  take home patch       []  in clinic   PD min [x]  Ice     []  Heat    location/position: To R shoulder in seated    min []  Vasopneumatic Device, press/temp:        min []  Other:    [x] Skin assessment post-treatment (if applicable):    [x]  intact    []  redness- no adverse reaction                  []redness  adverse reaction:        36 min Therapeutic Exercise:  [x]  See flow sheet    Rationale:      increase ROM and increase strength to improve the patients ability to improve functional abilities      15 min Manual Therapy: grade 3/4 posterior/inferior/anterior  [facility] mobs. [facility] PROM all planes. Rationale:      decrease pain, increase ROM, increase tissue extensibility, decrease trigger points and increase postural awareness to improve patient's ability to improve functional mobility   The manual therapy interventions were performed at a separate and distinct time from the therapeutic activities interventions. Billed With/As:   [x] TE   [] TA   [] Neuro   [] Self Care Patient Education: [x] Review HEP    [] Progressed/Changed HEP based on:   [] positioning   [] body mechanics   [] transfers   [] heat/ice application    [] other:      Other Objective/Functional Measures      Subjective % improvement: 75%  Improvements: improved ROM - reaching overhead, reaching behind back  Deficits: combing back of head, lack of strength     Cervical AROM: Flexion= 40 deg; Extension= 55 deg; Side bending= R= 40 deg, L= 35 deg; Rotation= 65 degrees bilaterally   R shoulder AROM: Flexion= 125 deg; Abduction= 115 deg; ER= 15 deg; IR= FIR to L3  R shoulder PROM: Flexion= 130 deg; Abduction= 123 deg; ER= 23 deg; IR=57 deg (ER/IR PROM measured at approximately 30 degrees of scaption)    R shoulder strength measurements/MMT: Flexion= 4+/5; Abduction= 4/5; ER= 3+/5; IR= 5/5      Post Treatment Pain Level (on 0 to 10) scale:   0  / 10     ASSESSMENT  Assessment/Changes in Function:   See PN. [x]  See Progress Note/Recertification   Patient will continue to benefit from skilled PT services to modify and progress therapeutic interventions, address functional mobility deficits, address ROM deficits, address strength deficits, analyze and address soft tissue restrictions, analyze and cue movement patterns, analyze and modify body mechanics/ergonomics, assess and modify postural abnormalities and instruct in home and community integration to attain remaining goals. Progress toward goals / Updated goals:  1. Pt will increase R shoulder PROM by >/= 15 deg in order to improve ease with dressing.  Current: met - increased by 20 degrees from last assessment (12/21/21)  2. Pt will demonstrate good supine cervical retraction for improved axial stability with sitting/standing ADL's. Current: progressing - fair performance in standing , requires VC's (12/21/21)  3. Pt will increase seated shoulder flexion to at least 130 for improved ease to reach to cabinets. Current: progressing - flexion AROM to 120 deg (12/21/21)  4. Pt will increase R FIR to at least L2 for improved ease with dressing. Current: progressing: met - FIR AROM to L3 (12/21/21)  5. Pt will increase L cervical SB to at least 40 deg for improved ease with sleeping.  Current: nearly met - R 40 , L 35  (12/21/21)     PLAN  [x]  Upgrade activities as tolerated yes Continue plan of care   []  Discharge due to :    [x]  Other: Cont 2x/wk for an additional 4 weeks to address remaining strength and ROM deficits      Therapist: Sammy Cleary PT    Date: 12/21/2021 Time: 7:01 PM      Future Appointments   Date Time Provider Narciso Hancock   12/21/2021  6:00 PM Francisco J Remy, PT [facility]   12/23/2021  6:00 PM [address] [place] [address]. [facility]   12/28/2021  6:00 PM Hansa Caal, PT [facility]   12/30/2021  6:00 PM Sophie Blackman [facility]

## 2021-12-23 ENCOUNTER — HOSPITAL ENCOUNTER (OUTPATIENT)
Dept: PHYSICAL THERAPY | Age: 66
Discharge: HOME | End: 2021-12-23
Payer: COMMERCIAL

## 2021-12-23 ENCOUNTER — APPOINTMENT (OUTPATIENT)
Dept: PHYSICAL THERAPY | Age: 66
End: 2021-12-23
Payer: COMMERCIAL

## 2021-12-23 PROCEDURE — 97110 THERAPEUTIC EXERCISES: CPT | Performed by: GENERAL ACUTE CARE HOSPITAL

## 2021-12-23 PROCEDURE — 97140 MANUAL THERAPY 1/> REGIONS: CPT | Performed by: GENERAL ACUTE CARE HOSPITAL

## 2021-12-23 NOTE — PROGRESS NOTES
PHYSICAL THERAPY - DAILY TREATMENT NOTE    Patient Name: Isac Henry        Date: 2021  : 1955   yes Patient  Verified  Visit #:     Insurance: Payor: [employer] / Plan: [employer] / Product Type: HMO /      In time: 10:57  Out time: 11:58   Total Treatment Time: 61     Medicare/BCBS Time Tracking (below)   Total Timed Codes (min):  51   1:1 Treatment Time:  n/a     TREATMENT AREA =  Neck pain [M54.2]  Pain in right shoulder [M25.511]    SUBJECTIVE  Pain Level (on 0 to 10 scale): 0/ 10      Medication Changes/New allergies or changes in medical history, any new surgeries or procedures?    no  If yes, update Summary List   Subjective Functional Status/Changes:  []  No changes reported     Pt reports some irritation in his R elbow today, think he slept on it funny.           OBJECTIVE  Modalities Rationale:     decrease inflammation and decrease pain to improve patient's ability to tolerate reaching overhead   min [] Estim, type/location:                                      []  att     []  unatt     []  w/US     []  w/ice    []  w/heat    min []  Mechanical Traction: type/lbs                   []  pro   []  sup   []  int   []  cont    []  before manual    []  after manual    min []  Ultrasound, settings/location:      min []  Iontophoresis w/ dexamethasone, location:                                               []  take home patch       []  in clinic   10 min [x]  Ice     []  Heat    location/position: To R shoulder in seated    min []  Vasopneumatic Device, press/temp:        min []  Other:    [x] Skin assessment post-treatment (if applicable):    [x]  intact    []  redness- no adverse reaction                  []redness  adverse reaction:        36 min Therapeutic Exercise:  [x]  See flow sheet    Rationale:      increase ROM and increase strength to improve the patients ability to improve functional abilities      15 min Manual Therapy: grade 3/4 posterior/inferior/anterior [facility] mobs. R shoulder PROM all planes. Passive GREGORIA stretching. Pec release. Rationale:      decrease pain, increase ROM, increase tissue extensibility, decrease trigger points and increase postural awareness to improve patient's ability to improve functional mobility   The manual therapy interventions were performed at a separate and distinct time from the therapeutic activities interventions. Billed With/As:   [x] TE   [] TA   [] Neuro   [] Self Care Patient Education: [x] Review HEP    [] Progressed/Changed HEP based on:   [] positioning   [] body mechanics   [] transfers   [] heat/ice application    [] other:      Other Objective/Functional Measures  Added TB ER/IR and shoulder ext   Attempted dowel GREGORIA stretch - difficulty obtaining a hand hold for this to be effective  Added Bodyblade in supine : ER/IR and flex/ext        Post Treatment Pain Level (on 0 to 10) scale:   0  / 10     ASSESSMENT  Assessment/Changes in Function:   Pt requiring VC/TC to prevent elbow and trunk compensation during resisted ER. Good tolerance to progression of strengthening exercises. Primary limitations of reduced GREGORIA and shoulder elevation, but making steady improvements. Plan to progress strength and ROM as tolerated. See \"other\" below for insurance update. [x]  See Progress Note/Recertification   Patient will continue to benefit from skilled PT services to modify and progress therapeutic interventions, address functional mobility deficits, address ROM deficits, address strength deficits, analyze and address soft tissue restrictions, analyze and cue movement patterns, analyze and modify body mechanics/ergonomics, assess and modify postural abnormalities and instruct in home and community integration to attain remaining goals. Progress toward goals / Updated goals:  New Goals to be achieved in __8-10__  treatments:  1. Pt will demo R shoulder flexion AROM >140 degrees in order to reach overhead cabinets with ease   2. Pt will demo R shoulder GREGORIA to T1 for ease of grooming ADL's   3. Pt will demo R shoulder ER strength to >4/5 for improved stabilization for reaching and lifting. Current: added TB ER (12/23/21)        PLAN  [x]  Upgrade activities as tolerated yes Continue plan of care   []  Discharge due to :    [x]  Other: Huyen Swanson has been sent off for approval, as currently only approved for 18 visits. Awaiting confirmation, but plan to have patient schedule more appts today.       Therapist: Marcell Barrow, PT    Date: 12/23/2021 Time: 7:01 PM      Future Appointments   Date Time Provider Narciso Hancock   12/23/2021 11:00 AM Zainab Laws, PT [facility]   12/28/2021  6:00 PM Bulmaro Freeman, PT [facility]   12/30/2021  6:00 PM Miah Brice [facility]

## 2021-12-28 ENCOUNTER — HOSPITAL ENCOUNTER (OUTPATIENT)
Dept: PHYSICAL THERAPY | Age: 66
Discharge: HOME | End: 2021-12-28
Payer: COMMERCIAL

## 2021-12-28 PROCEDURE — 97140 MANUAL THERAPY 1/> REGIONS: CPT

## 2021-12-28 PROCEDURE — 97110 THERAPEUTIC EXERCISES: CPT

## 2021-12-28 NOTE — PROGRESS NOTES
PHYSICAL THERAPY - DAILY TREATMENT NOTE    Patient Name: Shailesh Osullivan        Date: 2021  : 1955   yes Patient  Verified  Visit #:     Insurance: Payor: Tasha Abdi / Plan: [employer] / Product Type: HMO /      In time: 5:28 PM Out time: 6:33   Total Treatment Time: 65     Medicare/BCBS Time Tracking (below)   Total Timed Codes (min):  51   1:1 Treatment Time:  n/a     TREATMENT AREA =  Neck pain [M54.2]  Pain in right shoulder [M25.511]    SUBJECTIVE  Pain Level (on 0 to 10 scale): 0 / 10      Medication Changes/New allergies or changes in medical history, any new surgeries or procedures?    no  If yes, update Summary List   Subjective Functional Status/Changes:  []  No changes reported     Pt states he washed windows yesterday; it was a good activity to try and stretch out his shoulder       OBJECTIVE  Modalities Rationale:     decrease inflammation and decrease pain to improve patient's ability to tolerate reaching overhead   min [] Estim, type/location:                                      []  att     []  unatt     []  w/US     []  w/ice    []  w/heat    min []  Mechanical Traction: type/lbs                   []  pro   []  sup   []  int   []  cont    []  before manual    []  after manual    min []  Ultrasound, settings/location:      min []  Iontophoresis w/ dexamethasone, location:                                               []  take home patch       []  in clinic   10 min [x]  Ice     []  Heat    location/position: To R shoulder in seated    min []  Vasopneumatic Device, press/temp:        min []  Other:    [x] Skin assessment post-treatment (if applicable):    [x]  intact    []  redness- no adverse reaction                  []redness  adverse reaction:        38 min Therapeutic Exercise:  [x]  See flow sheet (-4 min UBE)   Rationale:      increase ROM and increase strength to improve the patients ability to improve functional abilities      13 min Manual Therapy: grade 3/4 posterior/inferior GHJ mobs. R shoulder PROM all planes. Contract relax PNF stretching for shoulder IR/ER. Rationale:      decrease pain, increase ROM, increase tissue extensibility, decrease trigger points and increase postural awareness to improve patient's ability to improve functional mobility   The manual therapy interventions were performed at a separate and distinct time from the therapeutic activities interventions. Billed With/As:   [x] TE   [] TA   [] Neuro   [] Self Care Patient Education: [x] Review HEP    [] Progressed/Changed HEP based on:   [] positioning   [] body mechanics   [] transfers   [] heat/ice application    [x] other: pt ed on being careful with scapular posterior tipping during supine shoulder flexion hang stretch; verbalized understanding       Other Objective/Functional Measures  Post manual: Shoulder PROM:  ER (@90 deg abd) 35.  IR (@~70 deg abd) 62 deg      -cues to maintain good form with band IR/ER; increased reps  -modified supine hang stretch to standing stretch at wall, elbow flexed, shoulder at 90 deg with contralat hand providing inferior glide/stabilization with staggered stance and shoulder flexion slide stretching to better isolate stretch to [facility] joint   -FIR strap stretch to ~L1 (12/21 pt at L3 for AROM)     Post Treatment Pain Level (on 0 to 10) scale:   0  / 10     ASSESSMENT  Assessment/Changes in Function:   Pt performing standing flexion/abd with significant scapular elevation and slight trunk SB'ing compensations >90 deg to attempt to increase ROM; cues to decrease substitutions. [facility] flexion limited to 90 deg prior to scapular elevation/posterior tipping and thoracic extension. Manual therapy focused on isolating stretches to [facility] joint capsule with scapular pinning. Plan to continue focus on increased shoulder A/PROM for improved ease with functional activities.      [x]  See Progress Note/Recertification   Patient will continue to benefit from skilled PT services to modify and progress therapeutic interventions, address functional mobility deficits, address ROM deficits, address strength deficits, analyze and address soft tissue restrictions, analyze and cue movement patterns, analyze and modify body mechanics/ergonomics, assess and modify postural abnormalities and instruct in home and community integration to attain remaining goals. Progress toward goals / Updated goals:    New Goals to be achieved in __8-10__  treatments:  1. Pt will demo R shoulder flexion AROM >140 degrees in order to reach overhead cabinets with ease   2. Pt will demo R shoulder GREGORIA to T1 for ease of grooming ADL's. -12/28: goal in progress; supine shoulder ER PROM 35 deg (@90 deg abd)  3. Pt will demo R shoulder ER strength to >4/5 for improved stabilization for reaching and lifting. Current: added TB ER (12/23/21)        PLAN  [x]  Upgrade activities as tolerated yes Continue plan of care   []  Discharge due to :    [x]  Other: Sangeeta Castellano has been sent off for approval 12/22, as currently only approved for 18 visits. Awaiting confirmation. Discussed with pt plan to cancel any sessions if insurance auth has not come through yet     Therapist: [address] ILIANA Rojas PT    Date: 12/28/2021 Time: 6:35 PM      Future Appointments   Date Time Provider Narciso Hancock   12/28/2021  6:00 PM Deborah Maher, PT [facility]   12/30/2021  6:00 PM Dorothey Pimple [facility]   1/4/2022  5:30 PM Dorothey Pimple [facility]   1/6/2022  6:00 PM Dorothey Pimple [facility]   1/11/2022  4:45 PM Dorothey Pimple [facility]   1/13/2022  6:00 PM Dorothey Pimple [facility]   1/17/2022  6:00 PM Albania Mandel PT [facility]   1/19/2022  6:00 PM Jan Pelaez PTA [facility]   1/25/2022  5:30 PM Ana Dale [facility]   1/27/2022  6:00 PM Ana Dale [facility]

## 2021-12-30 ENCOUNTER — HOSPITAL ENCOUNTER (OUTPATIENT)
Dept: PHYSICAL THERAPY | Age: 66
Discharge: HOME | End: 2021-12-30
Payer: COMMERCIAL

## 2021-12-30 PROCEDURE — 97110 THERAPEUTIC EXERCISES: CPT

## 2021-12-30 PROCEDURE — 97140 MANUAL THERAPY 1/> REGIONS: CPT

## 2022-01-04 ENCOUNTER — APPOINTMENT (OUTPATIENT)
Dept: PHYSICAL THERAPY | Age: 67
End: 2022-01-04
Payer: COMMERCIAL

## 2022-01-06 ENCOUNTER — HOSPITAL ENCOUNTER (OUTPATIENT)
Dept: PHYSICAL THERAPY | Age: 67
Discharge: HOME | End: 2022-01-06
Payer: COMMERCIAL

## 2022-01-06 PROCEDURE — 97110 THERAPEUTIC EXERCISES: CPT

## 2022-01-06 PROCEDURE — 97140 MANUAL THERAPY 1/> REGIONS: CPT

## 2022-01-06 NOTE — PROGRESS NOTES
PHYSICAL THERAPY - DAILY TREATMENT NOTE    Patient Name: Liberty First        Date: 2022  : 1955   yes Patient  Verified  Visit #:     Insurance: Payor: Heidy Albert / Plan: [address] [place] [address] [place] HMO / Product Type: HMO /      In time: 5:59PM Out time: 7:02   Total Treatment Time: 63     Medicare/BCBS Time Tracking (below)   Total Timed Codes (min):  53   1:1 Treatment Time:  n/a     TREATMENT AREA =  Neck pain [M54.2]  Pain in right shoulder [M25.511]    SUBJECTIVE  Pain Level (on 0 to 10 scale): 0 / 10      Medication Changes/New allergies or changes in medical history, any new surgeries or procedures?    no  If yes, update Summary List   Subjective Functional Status/Changes:  []  No changes reported     Pt reports he got a popping noise in his shoulder last night after doing the door hang stretch like the joint shifted.  It was not painful         OBJECTIVE  Modalities Rationale:     decrease inflammation and decrease pain to improve patient's ability to tolerate reaching overhead   min [] Estim, type/location:                                      []  att     []  unatt     []  w/US     []  w/ice    []  w/heat    min []  Mechanical Traction: type/lbs                   []  pro   []  sup   []  int   []  cont    []  before manual    []  after manual    min []  Ultrasound, settings/location:      min []  Iontophoresis w/ dexamethasone, location:                                               []  take home patch       []  in clinic   10 min [x]  Ice     []  Heat    location/position: To R shoulder in seated    min []  Vasopneumatic Device, press/temp:        min []  Other:    [x] Skin assessment post-treatment (if applicable):    [x]  intact    []  redness- no adverse reaction                  []redness  adverse reaction:        40 min Therapeutic Exercise:  [x]  See flow sheet    Rationale:      increase ROM and increase strength to improve the patients ability to improve functional abilities      13 min Manual Therapy: grade 3/4 posterior/inferior/Anterior GHJ mobs with strap. R shoulder PROM all planes. Rationale:      decrease pain, increase ROM, increase tissue extensibility, decrease trigger points and increase postural awareness to improve patient's ability to improve functional mobility   The manual therapy interventions were performed at a separate and distinct time from the therapeutic activities interventions. Billed With/As:   [x] TE   [] TA   [] Neuro   [] Self Care Patient Education: [x] Review HEP    [] Progressed/Changed HEP based on:   [] positioning   [] body mechanics   [] transfers   [] heat/ice application    [x] other: pt ed on being careful with scapular posterior tipping during supine shoulder flexion hang stretch; verbalized understanding       Other Objective/Functional Measures  added TRX rows, hang from barbell, ER in neutral with post, table ER, doorway rhomboid and pec stretch      Post Treatment Pain Level (on 0 to 10) scale:   0  / 10     ASSESSMENT  Assessment/Changes in Function:   Pt was instructed in TRX rows to fatigue latissimus prior to performing barbell hang to conitnue to progress shoulder flexion. Pt was also instructed in table ER stretch with post and table at varying angles to improve external rotation for ADLs. Pt reported no inc in pain with new stretches. Pt required vc and demo for all new stretches to perform correctly. [x]  See Progress Note/Recertification   Patient will continue to benefit from skilled PT services to modify and progress therapeutic interventions, address functional mobility deficits, address ROM deficits, address strength deficits, analyze and address soft tissue restrictions, analyze and cue movement patterns, analyze and modify body mechanics/ergonomics, assess and modify postural abnormalities and instruct in home and community integration to attain remaining goals.    Progress toward goals / Updated goals:  New Goals to be achieved in __8-10__  treatments:  1. Pt will demo R shoulder flexion AROM >140 degrees in order to reach overhead cabinets with ease   2. Pt will demo R shoulder GREGORIA to T1 for ease of grooming ADL's. -12/28: goal in progress; supine shoulder ER PROM 35 deg (@90 deg abd)  3. Pt will demo R shoulder ER strength to >4/5 for improved stabilization for reaching and lifting.  Current: added TB ER (12/23/21)        PLAN  [x]  Upgrade activities as tolerated yes Continue plan of care   []  Discharge due to :    [x]  Other:      Therapist: Tien Bradford    Date: 1/6/2022 Time: 6:35 PM      Future Appointments   Date Time Provider Narciso Hancock   1/6/2022  6:00 PM Valere Bulls [facility]   1/11/2022  4:45 PM Valere Bulls [facility]   1/13/2022  6:00 PM Valere Bulls [facility]   1/17/2022  6:00 PM Steph Ruiz PT [facility]   1/19/2022  6:00 PM Colleen Barajas PTA [facility]   1/25/2022  5:30 PM Ryne Nicholas [facility]   1/27/2022  6:00 PM Ryne Nicholas [facility]

## 2022-01-11 ENCOUNTER — APPOINTMENT (OUTPATIENT)
Dept: PHYSICAL THERAPY | Age: 67
End: 2022-01-11
Payer: COMMERCIAL

## 2022-01-13 ENCOUNTER — HOSPITAL ENCOUNTER (OUTPATIENT)
Dept: PHYSICAL THERAPY | Age: 67
Discharge: HOME | End: 2022-01-13
Payer: COMMERCIAL

## 2022-01-13 PROCEDURE — 97110 THERAPEUTIC EXERCISES: CPT

## 2022-01-13 PROCEDURE — 97140 MANUAL THERAPY 1/> REGIONS: CPT

## 2022-01-13 NOTE — PROGRESS NOTES
PHYSICAL THERAPY - DAILY TREATMENT NOTE    Patient Name: Rachell Carey        Date: 2022  : 1955   yes Patient  Verified  Visit #:     Insurance: Payor: Keila Nava / Plan: [address] [place] HMO / Product Type: HMO /      In time: 5:52PM Out time: 6:55   Total Treatment Time: 63     Medicare/BCBS Time Tracking (below)   Total Timed Codes (min):  53   1:1 Treatment Time:  n/a     TREATMENT AREA =  Neck pain [M54.2]  Pain in right shoulder [M25.511]    SUBJECTIVE  Pain Level (on 0 to 10 scale): 0 / 10      Medication Changes/New allergies or changes in medical history, any new surgeries or procedures?    no  If yes, update Summary List   Subjective Functional Status/Changes:  []  No changes reported     Pt reports he has continued to work on his stretches at home     OBJECTIVE  Modalities Rationale:     decrease inflammation and decrease pain to improve patient's ability to tolerate reaching overhead   min [] Estim, type/location:                                      []  att     []  unatt     []  w/US     []  w/ice    []  w/heat    min []  Mechanical Traction: type/lbs                   []  pro   []  sup   []  int   []  cont    []  before manual    []  after manual    min []  Ultrasound, settings/location:      min []  Iontophoresis w/ dexamethasone, location:                                               []  take home patch       []  in clinic   10 min [x]  Ice     []  Heat    location/position: To R shoulder in seated    min []  Vasopneumatic Device, press/temp:        min []  Other:    [x] Skin assessment post-treatment (if applicable):    [x]  intact    []  redness- no adverse reaction                  []redness - adverse reaction:        40 min Therapeutic Exercise:  [x]  See flow sheet    Rationale:      increase ROM and increase strength to improve the patients ability to improve functional abilities      13 min Manual Therapy: grade 3/4 posterior/inferior/Anterior GHJ mobs R shoulder PROM all planes. Prone shoulder ext/IR stretching, supine flexion with ER stretching    Rationale:      decrease pain, increase ROM, increase tissue extensibility, decrease trigger points and increase postural awareness to improve patient's ability to improve functional mobility   The manual therapy interventions were performed at a separate and distinct time from the therapeutic activities interventions. Billed With/As:   [x] TE   [] TA   [] Neuro   [] Self Care Patient Education: [x] Review HEP    [] Progressed/Changed HEP based on:   [] positioning   [] body mechanics   [] transfers   [] heat/ice application    [x] other: pt ed on being careful with scapular posterior tipping during supine shoulder flexion hang stretch; verbalized understanding       Other Objective/Functional Measures  added ball up wall, kirill unilateral pull down     Post Treatment Pain Level (on 0 to 10) scale:   0 -1 / 10     ASSESSMENT  Assessment/Changes in Function:   Pt was able to tolerate addition of ball up wall and kirill unilateral pull downs to continue to progress functional strength through currently available ROM. Pt reported no inc in sx with tx, only mm fatigue. PT plans to continue to progress pt as tolerated. [x]  See Progress Note/Recertification   Patient will continue to benefit from skilled PT services to modify and progress therapeutic interventions, address functional mobility deficits, address ROM deficits, address strength deficits, analyze and address soft tissue restrictions, analyze and cue movement patterns, analyze and modify body mechanics/ergonomics, assess and modify postural abnormalities and instruct in home and community integration to attain remaining goals. Progress toward goals / Updated goals:  New Goals to be achieved in __8-10__  treatments:  1. Pt will demo R shoulder flexion AROM >140 degrees in order to reach overhead cabinets with ease addressing with barbell hang 1/13/22  2.  Pt will demo R shoulder GREGORIA to T1 for ease of grooming ADL's. -12/28: goal in progress; supine shoulder ER PROM 35 deg (@90 deg abd)  3. Pt will demo R shoulder ER strength to >4/5 for improved stabilization for reaching and lifting.  Current: added TB ER (12/23/21)        PLAN  [x]  Upgrade activities as tolerated yes Continue plan of care   []  Discharge due to :    [x]  Other:      Therapist: Summer Moncada    Date: 1/13/2022 Time: 6:35 PM      Future Appointments   Date Time Provider Narciso Hancock   1/13/2022  6:00 PM Linzie Notice [address]   1/17/2022  6:00 PM Pauly De Leon PT [facility] [address]   1/19/2022  6:00 PM Conrado Doyle PTA [facility] [address]   1/25/2022  5:30 PM Jas Pandey [address] [employer] [address]   1/27/2022  6:00 PM Linzie Notice [address]

## 2022-01-17 ENCOUNTER — HOSPITAL ENCOUNTER (OUTPATIENT)
Dept: PHYSICAL THERAPY | Age: 67
Discharge: HOME | End: 2022-01-17
Payer: COMMERCIAL

## 2022-01-17 PROCEDURE — 97110 THERAPEUTIC EXERCISES: CPT

## 2022-01-17 PROCEDURE — 97140 MANUAL THERAPY 1/> REGIONS: CPT

## 2022-01-17 NOTE — PROGRESS NOTES
[address] [place] [address] PHYSICAL THERAPY  [facility], [place], [address] - Phone: [phone]  Fax: [phone]  PROGRESS NOTE  Patient Name: Tigre Roblero : 1955   Treatment/Medical Diagnosis: Neck pain [M54.2]  Pain in right shoulder [M25.511]   Referral Source: Shyam Conrad DO     Date of Initial Visit: 10/28/21 Attended Visits: 22 Missed Visits: 1     SUMMARY OF TREATMENT  Pt has attended 22 sessions of PT for the tx of R adhesive capsulitis/neck strain. PT tx has consisted of therex, manual tx, modalities prn, and HEP in order to improve c/s flexibility/ROM, GHJ mobility, periscapular stability, RC strength and dec pain. CURRENT STATUS  Pt reports 85% overall improvement since beginning PT    Improvements: grooming, washing hair, dressing, reaching overhead, reduction in pain  Remaining functional limitations/impairments: ROM for throwing, strength for overhead lifting    R shoulder AROM: Flexion= 130 deg; Abduction= 132 deg; IR= FIR to T7, GREGORIA to C2  R shoulder PROM: Flexion= 150 deg; Abduction= 155 deg; ER (scapular plane)= 40 deg; ER (90/90) 24, IR (scapular plane)=50 deg    R shoulder strength measurements/MMT: Flexion= 4+/5; Abduction= 4-/5; ER= 3+/5; IR= 5/5  Mid trap MMT 3+/5, low trap 2+/5, SA 4/5    FOTO 65/100, GROC +7    Goal/Measure of Progress Goal Met? 1. Pt will demo R shoulder flexion AROM > 140 deg in order to reach overhead cabinets with ease   Status at last Eval: 125 Current Status: 130 progress   2. Pt will demo R shoulder GREGORIA to T1 for ease of grooming ADL's   Status at last Eval: Occiput w/ compensated neck flexion Current Status: C2, mild c/s flexion compensation progress   3. Pt will demo R shoulder ER strength to >4/5 for improved stabilization for reaching and lifting      Status at last Eval: 3+/5 Current Status: 3+/5 no     New Goals to be achieved in __4__  treatments:  1.  Pt will demo R shoulder flexion AROM >140 degrees in order to reach overhead cabinets with ease  2. Pt will demo R shoulder GREGORIA to T1 for ease of grooming ADL's  3. Pt will demo R shoulder ER strength to >4/5 for improved stabilization for reaching and lifting   4. Pt will demo I w/ extensive, long-term HEP for continued progress in ROM/mobility and strength upon DC from PT      RECOMMENDATIONS  Cont 2x/wk for an additional 5 sessions to address remaining impairments and functional limitations and to progress long-term HEP for continued self management of sx. If you have any questions/comments please contact us directly at [phone]. Thank you for allowing us to assist in the care of your patient. Therapist Signature: Angie Gray PT Date: 1/17/2022     Time: 2:38 PM   NOTE TO PHYSICIAN:  PLEASE COMPLETE THE ORDERS BELOW AND FAX TO   [facility]: [phone]  If you are unable to process this request in 24 hours please contact our office: [phone]    ___ I have read the above report and request that my patient continue as recommended.   ___ I have read the above report and request that my patient continue therapy with the following changes/special instructions:_________________________________________________________   ___ I have read the above report and request that my patient be discharged from therapy.      Physician Signature:        Date:       Time:       Phyllis Murillo DO negative...

## 2022-01-17 NOTE — PROGRESS NOTES
PHYSICAL THERAPY - DAILY TREATMENT NOTE    Patient Name: Mayra Briceño        Date: 2022  : 1955   yes Patient  Verified  Visit #:     Insurance: Payor: Nat Vasquez / Plan: [employer] HMO / Product Type: HMO /      In time: 5:53 Out time: 7:00   Total Treatment Time: 67     Medicare/BCBS Time Tracking (below)   Total Timed Codes (min):  n/a  1:1 Treatment Time:  n/a     TREATMENT AREA =  Neck pain [M54.2]  Pain in right shoulder [M25.511]    SUBJECTIVE  Pain Level (on 0 to 10 scale): 0 / 10      Medication Changes/New allergies or changes in medical history, any new surgeries or procedures?    no  If yes, update Summary List   Subjective Functional Status/Changes:  []  No changes reported     \"My shoulder has been feeling good the last few weeks.  I've been noticing a lot more mobility\"       OBJECTIVE  Modalities Rationale:     decrease inflammation and decrease pain to improve patient's ability to tolerate reaching overhead   min [] Estim, type/location:                                      []  att     []  unatt     []  w/US     []  w/ice    []  w/heat    min []  Mechanical Traction: type/lbs                   []  pro   []  sup   []  int   []  cont    []  before manual    []  after manual    min []  Ultrasound, settings/location:      min []  Iontophoresis w/ dexamethasone, location:                                               []  take home patch       []  in clinic   10 min [x]  Ice     []  Heat    location/position: To R shoulder in seated    min []  Vasopneumatic Device, press/temp:        min []  Other:    [x] Skin assessment post-treatment (if applicable):    [x]  intact    []  redness- no adverse reaction                  []redness - adverse reaction:        42 min Therapeutic Exercise:  [x]  See flow sheet    Rationale:      increase ROM and increase strength to improve the patients ability to improve functional abilities      15 min Manual Therapy: DTM/TPR to R infraspinatus, subscap; ant/post/inf GHJ mobs gr IV/ PROM flex/abd/ER (scapular plane and 90/90)/IR   Rationale:      decrease pain, increase ROM, increase tissue extensibility, decrease trigger points and increase postural awareness to improve patient's ability to improve functional mobility   The manual therapy interventions were performed at a separate and distinct time from the therapeutic activities interventions. Billed With/As:   [x] TE   [] TA   [] Neuro   [] Self Care Patient Education: [x] Review HEP    [] Progressed/Changed HEP based on:   [] positioning   [] body mechanics   [] transfers   [] heat/ice application    [x] other: pt ed on being careful with scapular posterior tipping during supine shoulder flexion hang stretch; verbalized understanding       Other Objective/Functional Measures    See PN     Post Treatment Pain Level (on 0 to 10) scale:   0 / 10     ASSESSMENT  Assessment/Changes in Function:     See PN     [x]  See Progress Note/Recertification   Patient will continue to benefit from skilled PT services to modify and progress therapeutic interventions, address functional mobility deficits, address ROM deficits, address strength deficits, analyze and address soft tissue restrictions, analyze and cue movement patterns, analyze and modify body mechanics/ergonomics, assess and modify postural abnormalities and instruct in home and community integration to attain remaining goals.    Progress toward goals / Updated goals:    See PN        PLAN  [x]  Upgrade activities as tolerated yes Continue plan of care   []  Discharge due to :    [x]  Other:      Therapist: Niesha Lara PT    Date: 1/17/2022 Time: 6:35 PM      Future Appointments   Date Time Provider Narciso Hancock   1/17/2022  6:00 PM Ronald Calle, PT [facility]   1/19/2022  6:00 PM Lilly Brennan PTA [facility]   1/25/2022  5:30 PM Jovita Gonzales [address] [employer] [facility]   1/27/2022  6:00 PM Natali Martin [facility]

## 2022-01-19 ENCOUNTER — HOSPITAL ENCOUNTER (OUTPATIENT)
Dept: PHYSICAL THERAPY | Age: 67
Discharge: HOME | End: 2022-01-19
Payer: COMMERCIAL

## 2022-01-19 PROCEDURE — 97140 MANUAL THERAPY 1/> REGIONS: CPT

## 2022-01-19 PROCEDURE — 97110 THERAPEUTIC EXERCISES: CPT

## 2022-01-19 NOTE — PROGRESS NOTES
PHYSICAL THERAPY - DAILY TREATMENT NOTE    Patient Name: Benito Castillo        Date: 2022  : 1955   yes Patient  Verified  Visit #:     Insurance: Payor: Karina Burton / Plan: Jesse Sebastian HMO / Product Type: HMO /      In time: 6:00 Out time: 7:07   Total Treatment Time: 67     Medicare/BCBS Time Tracking (below)   Total Timed Codes (min):   1:1 Treatment Time:       TREATMENT AREA =  Neck pain [M54.2]  Pain in right shoulder [M25.511]    SUBJECTIVE  Pain Level (on 0 to 10 scale):  0  / 10   Medication Changes/New allergies or changes in medical history, any new surgeries or procedures?    no  If yes, update Summary List   Subjective Functional Status/Changes:  []  No changes reported     My neck and shoulder has been doing really good over the past few times, I haven't really had any pain at all, I just feel like I need to work on my ROM in a few spots.            OBJECTIVE  Modalities Rationale:     decrease inflammation and decrease pain to improve patient's ability to improve functional abilities    min [] Estim, type/location:                                      []  att     []  unatt     []  w/US     []  w/ice    []  w/heat    min []  Mechanical Traction: type/lbs                   []  pro   []  sup   []  int   []  cont    []  before manual    []  after manual    min []  Ultrasound, settings/location:      min []  Iontophoresis w/ dexamethasone, location:                                               []  take home patch       []  in clinic   10 min [x]  Ice     []  Heat    location/position: R shoulder/seated    min []  Vasopneumatic Device, press/temp:    If using vaso (only need to measure limb vaso being performed on)      pre-treatment girth :       post-treatment girth :       measured at (landmark location) :    Vasopnuematic compression justification:  Per bilateral girth measures taken and listed above the edema is considered significant and having an impact on the patient's     min []  Other:    [x] Skin assessment post-treatment (if applicable):    [x]  intact    [x]  redness- no adverse reaction                  []redness - adverse reaction:      40 min Therapeutic Exercise:  [x]  See flow sheet   Rationale:      increase ROM and increase strength to improve the patients ability to improve functional abilities      17 min Manual Therapy: sidelying scapular mobs, supine GH distraction, grade 3 posterior/inferior GH mobs, subscapularis releases with shoulder in static overhead hold and GH PROM all planes   Rationale:      decrease pain, increase ROM, increase tissue extensibility, decrease trigger points and increase postural awareness to improve patient's ability to improve functional mobility   The manual therapy interventions were performed at a separate and distinct time from the therapeutic activities interventions. Billed With/As:   [] TE   [] TA   [] Neuro   [] Self Care Patient Education: [x] Review HEP    [] Progressed/Changed HEP based on:   [] positioning   [] body mechanics   [] transfers   [] heat/ice application    [] other:      Other Objective/Functional Measures:  Adapted Sports Performance based exercises to therapy side today due to SP area being used for off ice training       Post Treatment Pain Level (on 0 to 10) scale:   0  / 10     ASSESSMENT  Assessment/Changes in Function:   Pt reports the most functional improvement with increased mobility with ADL's especially with IR activities behind his back as well as overhead mobility. Pt also presented with increased palpable subscapularis tension/restrictions with overhead passive stretching, but presented with increased mobility with performing subscapularis releases with shoulder in static overhead hold with manual intervention today. Will continue to progress/advance patient within current POC as tolerated with monitoring symptoms.      []  See Progress Note/Recertification   Patient will continue to benefit from skilled PT services to modify and progress therapeutic interventions, address functional mobility deficits, address ROM deficits, address strength deficits, analyze and address soft tissue restrictions, analyze and cue movement patterns, analyze and modify body mechanics/ergonomics, assess and modify postural abnormalities and instruct in home and community integration to attain remaining goals. Progress toward goals / Updated goals:  New Goals to be achieved in __4__  treatments:  1. Pt will demo R shoulder flexion AROM >140 degrees in order to reach overhead cabinets with ease  2. Pt will demo R shoulder GREGORIA to T1 for ease of grooming ADL's  3. Pt will demo R shoulder ER strength to >4/5 for improved stabilization for reaching and lifting   4.  Pt will demo I w/ extensive, long-term HEP for continued progress in ROM/mobility and strength upon DC from PT     PLAN  [x]  Upgrade activities as tolerated yes Continue plan of care   []  Discharge due to :    []  Other:      Therapist: Bard Lawrence PTA    Date: 1/19/2022 Time: 5:59 PM     Future Appointments   Date Time Provider Narciso Hancock   1/19/2022  6:00 PM Glenn Jorge [place] [facility]   1/25/2022  5:30 PM Victor Manuel Jacksont [facility]   1/27/2022  6:00 PM Sumallika Jacksont [facility]   2/1/2022  4:45 PM Sueeschuyler Jacksont [facility]   2/3/2022  6:00 PM Anupama Hardy

## 2022-01-25 ENCOUNTER — HOSPITAL ENCOUNTER (OUTPATIENT)
Dept: PHYSICAL THERAPY | Age: 67
Discharge: HOME | End: 2022-01-25
Payer: COMMERCIAL

## 2022-01-25 PROCEDURE — 97140 MANUAL THERAPY 1/> REGIONS: CPT

## 2022-01-25 PROCEDURE — 97110 THERAPEUTIC EXERCISES: CPT

## 2022-01-25 NOTE — PROGRESS NOTES
PHYSICAL THERAPY - DAILY TREATMENT NOTE    Patient Name: Tania De Los Santos        Date: 2022  : 1955   yes Patient  Verified  Visit #:     Insurance: Payor: Anish Chaparro / Plan: [place] HMO / Product Type: HMO /      In time: 5:30 Out time: 633   Total Treatment Time: 63     Medicare/BCBS Time Tracking (below)   Total Timed Codes (min):  53 1:1 Treatment Time:       TREATMENT AREA =  Neck pain [M54.2]  Pain in right shoulder [M25.511]    SUBJECTIVE  Pain Level (on 0 to 10 scale):  0 / 10   Medication Changes/New allergies or changes in medical history, any new surgeries or procedures?    no  If yes, update Summary List   Subjective Functional Status/Changes:  []  No changes reported   Pt reports he did some shoveling over the weekend, the shoulder did not get painful, it continues to be stiff.         OBJECTIVE  Modalities Rationale:     decrease inflammation and decrease pain to improve patient's ability to improve functional abilities    min [] Estim, type/location:                                      []  att     []  unatt     []  w/US     []  w/ice    []  w/heat    min []  Mechanical Traction: type/lbs                   []  pro   []  sup   []  int   []  cont    []  before manual    []  after manual    min []  Ultrasound, settings/location:      min []  Iontophoresis w/ dexamethasone, location:                                               []  take home patch       []  in clinic   10 min [x]  Ice     []  Heat    location/position: R shoulder/seated    min []  Vasopneumatic Device, press/temp:    If using vaso (only need to measure limb vaso being performed on)      pre-treatment girth :       post-treatment girth :       measured at (landmark location) :    Vasopnuematic compression justification:  Per bilateral girth measures taken and listed above the edema is considered significant and having an impact on the patient's     min []  Other:    [x] Skin assessment post-treatment (if applicable):    [x]  intact    [x]  redness- no adverse reaction                  []redness - adverse reaction:      38 min Therapeutic Exercise:  [x]  See flow sheet   Rationale:      increase ROM and increase strength to improve the patients ability to improve functional abilities      15 min Manual Therapy: Prone manual internal rotation stretch with scapular retraction, sidelying ER/flexion combination stretching, Gr III distraction with flexion stretching, manual abduction with Gr III posterior glide   Rationale:      decrease pain, increase ROM, increase tissue extensibility, decrease trigger points and increase postural awareness to improve patient's ability to improve functional mobility   The manual therapy interventions were performed at a separate and distinct time from the therapeutic activities interventions. Billed With/As:   [] TE   [] TA   [] Neuro   [] Self Care Patient Education: [x] Review HEP    [] Progressed/Changed HEP based on:   [] positioning   [] body mechanics   [] transfers   [] heat/ice application    [] other:      Other Objective/Functional Measures: Added IR wall stretch, ER supported on table in 45 deg abd and flexion   Post Treatment Pain Level (on 0 to 10) scale:   0  / 10     ASSESSMENT  Assessment/Changes in Function: Pt was able to tolerate new exercises as listed above without complaint. External rotation exercises were added with intention of reinforcing ROM gains for improved ability to reach overhead and behind (I.e. washing hair). Pt HEP was updated with written instructions on wall IR stretch as pt reported productive intensity with the stretch.       []  See Progress Note/Recertification   Patient will continue to benefit from skilled PT services to modify and progress therapeutic interventions, address functional mobility deficits, address ROM deficits, address strength deficits, analyze and address soft tissue restrictions, analyze and cue movement patterns, analyze and modify body mechanics/ergonomics, assess and modify postural abnormalities and instruct in home and community integration to attain remaining goals. Progress toward goals / Updated goals:  New Goals to be achieved in __4__  treatments:  1. Pt will demo R shoulder flexion AROM >140 degrees in order to reach overhead cabinets with ease addressing with bar hang 1/25/22  2. Pt will demo R shoulder GREGORIA to T1 for ease of grooming ADL's  3. Pt will demo R shoulder ER strength to >4/5 for improved stabilization for reaching and lifting   4.  Pt will demo I w/ extensive, long-term HEP for continued progress in ROM/mobility and strength upon DC from PT     PLAN  [x]  Upgrade activities as tolerated yes Continue plan of care   []  Discharge due to :    []  Other:      Therapist: Sarah Hatch    Date: 1/25/2022 Time: 5:59 PM     Future Appointments   Date Time Provider Narciso Hancock   1/25/2022  5:30 PM Walterine Rack [facility]   1/27/2022  6:00 PM Walterine Rack [facility]   2/1/2022  4:45 PM Walterine Rack [facility]   2/3/2022  6:00 PM Alpa Moyer

## 2022-01-27 ENCOUNTER — HOSPITAL ENCOUNTER (OUTPATIENT)
Dept: PHYSICAL THERAPY | Age: 67
Discharge: HOME | End: 2022-01-27
Payer: COMMERCIAL

## 2022-01-27 PROCEDURE — 97140 MANUAL THERAPY 1/> REGIONS: CPT

## 2022-01-27 PROCEDURE — 97110 THERAPEUTIC EXERCISES: CPT

## 2022-01-27 NOTE — PROGRESS NOTES
PHYSICAL THERAPY - DAILY TREATMENT NOTE    Patient Name: Graciela Parent        Date: 2022  : 1955   yes Patient  Verified  Visit #:     Insurance: Payor: Ele Pratt / Plan: [address] [place] HMO / Product Type: HMO /      In time: 5:59 Out time: 7:00   Total Treatment Time: 61     Medicare/BCBS Time Tracking (below)   Total Timed Codes (min):  51 1:1 Treatment Time:       TREATMENT AREA =  Neck pain [M54.2]  Pain in right shoulder [M25.511]    SUBJECTIVE  Pain Level (on 0 to 10 scale):  0 / 10   Medication Changes/New allergies or changes in medical history, any new surgeries or procedures?    no  If yes, update Summary List   Subjective Functional Status/Changes:  []  No changes reported   Pt reports he has been working on stretching his shoulder with a bat, it seems to work well     OBJECTIVE  Modalities Rationale:     decrease inflammation and decrease pain to improve patient's ability to improve functional abilities    min [] Estim, type/location:                                      []  att     []  unatt     []  w/US     []  w/ice    []  w/heat    min []  Mechanical Traction: type/lbs                   []  pro   []  sup   []  int   []  cont    []  before manual    []  after manual    min []  Ultrasound, settings/location:      min []  Iontophoresis w/ dexamethasone, location:                                               []  take home patch       []  in clinic   10 min [x]  Ice     []  Heat    location/position: R shoulder/seated    min []  Vasopneumatic Device, press/temp:    If using vaso (only need to measure limb vaso being performed on)      pre-treatment girth :       post-treatment girth :       measured at (landmark location) :    Vasopnuematic compression justification:  Per bilateral girth measures taken and listed above the edema is considered significant and having an impact on the patient's     min []  Other:    [x] Skin assessment post-treatment (if applicable):    [x]  intact [x]  redness- no adverse reaction                  []redness  adverse reaction:      38 min Therapeutic Exercise:  [x]  See flow sheet   Rationale:      increase ROM and increase strength to improve the patients ability to improve functional abilities      13 min Manual Therapy: Supine ER/IR with elbow flexion/ext combination stretching, supine manual extension with IR stretching Gr III distraction with flexion stretching, manual abduction with Gr III posterior glide   Rationale:      decrease pain, increase ROM, increase tissue extensibility, decrease trigger points and increase postural awareness to improve patient's ability to improve functional mobility   The manual therapy interventions were performed at a separate and distinct time from the therapeutic activities interventions. Billed With/As:   [] TE   [] TA   [] Neuro   [] Self Care Patient Education: [x] Review HEP    [] Progressed/Changed HEP based on:   [] positioning   [] body mechanics   [] transfers   [] heat/ice application    [] other:      Other Objective/Functional Measures: Added supine pec stretch, and supine ER/IR   Post Treatment Pain Level (on 0 to 10) scale:   0  / 10     ASSESSMENT  Assessment/Changes in Function:  Pt tolerated new exercises without complaint. Pt HEP was updated to include supine pec stretch for improved shoulder abduction and external rotation for improved overhead reaching and grooming activities. Pt required vc and demo with new exercises to perform correctly. Pt was also instructed in active supine ER/IR with hand weight with intention of improving strength and actively stretching to further improve shoulder ROM.       []  See Progress Note/Recertification   Patient will continue to benefit from skilled PT services to modify and progress therapeutic interventions, address functional mobility deficits, address ROM deficits, address strength deficits, analyze and address soft tissue restrictions, analyze and cue movement patterns, analyze and modify body mechanics/ergonomics, assess and modify postural abnormalities and instruct in home and community integration to attain remaining goals. Progress toward goals / Updated goals:  New Goals to be achieved in __4__  treatments:  1. Pt will demo R shoulder flexion AROM >140 degrees in order to reach overhead cabinets with ease addressing with bar hang 1/25/22  2. Pt will demo R shoulder GREGORIA to T1 for ease of grooming ADL's  Addressing with pec stretch 1/27/22  3. Pt will demo R shoulder ER strength to >4/5 for improved stabilization for reaching and lifting   4.  Pt will demo I w/ extensive, long-term HEP for continued progress in ROM/mobility and strength upon DC from PT     PLAN  [x]  Upgrade activities as tolerated yes Continue plan of care   []  Discharge due to :    []  Other:      Therapist: Kayleen Aj    Date: 1/27/2022 Time: 5:59 PM     Future Appointments   Date Time Provider Narciso Hancock   1/27/2022  6:00 PM Edison Bright [facility]   2/1/2022  4:45 PM Edison Bright [facility]   2/3/2022  6:00 PM Laneta Sport

## 2022-02-01 ENCOUNTER — HOSPITAL ENCOUNTER (OUTPATIENT)
Dept: PHYSICAL THERAPY | Age: 67
Discharge: HOME | End: 2022-02-01
Payer: COMMERCIAL

## 2022-02-01 PROCEDURE — 97140 MANUAL THERAPY 1/> REGIONS: CPT

## 2022-02-01 PROCEDURE — 97110 THERAPEUTIC EXERCISES: CPT

## 2022-02-01 NOTE — PROGRESS NOTES
PHYSICAL THERAPY - DAILY TREATMENT NOTE    Patient Name: Catrina Fountain        Date: 2022  : 1955   yes Patient  Verified  Visit #:   32   of   27  Insurance: Payor: Curt Can / Plan: Anna [place] HMO / Product Type: HMO /      In time: 4:46 Out time: 5:42   Total Treatment Time: 56     Medicare/BCBS Time Tracking (below)   Total Timed Codes (min):  46 1:1 Treatment Time:       TREATMENT AREA =  Neck pain [M54.2]  Pain in right shoulder [M25.511]    SUBJECTIVE  Pain Level (on 0 to 10 scale):  0 / 10   Medication Changes/New allergies or changes in medical history, any new surgeries or procedures?    no  If yes, update Summary List   Subjective Functional Status/Changes:  []  No changes reported   Pt reports the shoulder is doing ok, it is stiff, but getting better     OBJECTIVE  Modalities Rationale:     decrease inflammation and decrease pain to improve patient's ability to improve functional abilities    min [] Estim, type/location:                                      []  att     []  unatt     []  w/US     []  w/ice    []  w/heat    min []  Mechanical Traction: type/lbs                   []  pro   []  sup   []  int   []  cont    []  before manual    []  after manual    min []  Ultrasound, settings/location:      min []  Iontophoresis w/ dexamethasone, location:                                               []  take home patch       []  in clinic   10 min [x]  Ice     []  Heat    location/position: R shoulder/seated    min []  Vasopneumatic Device, press/temp:    If using vaso (only need to measure limb vaso being performed on)      pre-treatment girth :       post-treatment girth :       measured at (landmark location) :    Vasopnuematic compression justification:  Per bilateral girth measures taken and listed above the edema is considered significant and having an impact on the patient's     min []  Other:    [x] Skin assessment post-treatment (if applicable):    [x]  intact    [x]  redness- no adverse reaction                  []redness  adverse reaction:      31 min Therapeutic Exercise:  [x]  See flow sheet   Rationale:      increase ROM and increase strength to improve the patients ability to improve functional abilities      15 min Manual Therapy: Supine ER/IR with elbow flexion/ext combination stretching, supine manual extension with IR stretching Gr III distraction with flexion stretching, manual abduction with Gr III posterior glide   Rationale:      decrease pain, increase ROM, increase tissue extensibility, decrease trigger points and increase postural awareness to improve patient's ability to improve functional mobility   The manual therapy interventions were performed at a separate and distinct time from the therapeutic activities interventions. Billed With/As:   [] TE   [] TA   [] Neuro   [] Self Care Patient Education: [x] Review HEP    [] Progressed/Changed HEP based on:   [] positioning   [] body mechanics   [] transfers   [] heat/ice application    [] other:      Other Objective/Functional Measures:  Reviewed HEP   Post Treatment Pain Level (on 0 to 10) scale:   0  / 10     ASSESSMENT  Assessment/Changes in Function:  Pt HEP was reviewed in preparation for DC NV. Pt was educated re shoulder motions I.e. flexion, extension, abduction, IR, ER, and need for continuation of stretching in all planes for continued progressions with ROM. Pt verbalized understanding. Pt tolerated session well today, reporting no inc in sx with any exercise, overall he is progressing well, and will likely continue to progress with adherence to HEP.         []  See Progress Note/Recertification   Patient will continue to benefit from skilled PT services to modify and progress therapeutic interventions, address functional mobility deficits, address ROM deficits, address strength deficits, analyze and address soft tissue restrictions, analyze and cue movement patterns, analyze and modify body mechanics/ergonomics, assess and modify postural abnormalities and instruct in home and community integration to attain remaining goals. Progress toward goals / Updated goals:  New Goals to be achieved in __4__  treatments:  1. Pt will demo R shoulder flexion AROM >140 degrees in order to reach overhead cabinets with ease addressing with bar hang 1/25/22  2. Pt will demo R shoulder GREGORIA to T1 for ease of grooming ADL's  Addressing with pec stretch 1/27/22  3. Pt will demo R shoulder ER strength to >4/5 for improved stabilization for reaching and lifting   4.  Pt will demo I w/ extensive, long-term HEP for continued progress in ROM/mobility and strength upon DC from PT     PLAN  [x]  Upgrade activities as tolerated yes Continue plan of care   []  Discharge due to :    []  Other:      Therapist: Yogesh Rodas    Date: 2/1/2022 Time: 5:59 PM     Future Appointments   Date Time Provider Narciso Hancock   2/1/2022  4:45 PM Angelina [facility]   2/3/2022  6:00 PM Wandalee Burow [facility]

## 2022-02-03 ENCOUNTER — HOSPITAL ENCOUNTER (OUTPATIENT)
Dept: PHYSICAL THERAPY | Age: 67
Discharge: HOME | End: 2022-02-03
Payer: COMMERCIAL

## 2022-02-03 PROCEDURE — 97140 MANUAL THERAPY 1/> REGIONS: CPT

## 2022-02-03 PROCEDURE — 97110 THERAPEUTIC EXERCISES: CPT

## 2022-02-03 NOTE — PROGRESS NOTES
PHYSICAL THERAPY - DAILY TREATMENT NOTE    Patient Name: Mauricio Joshi        Date: 2/3/2022  : 1955   yes Patient  Verified  Visit #:   32   of   27  Insurance: Payor: Karan Kawasaki / Plan: Filomena Acosta HMO / Product Type: HMO /      In time: 5:58 Out time: 700   Total Treatment Time: 62     Medicare/BCBS Time Tracking (below)   Total Timed Codes (min):  52 1:1 Treatment Time:       TREATMENT AREA =  Neck pain [M54.2]  Pain in right shoulder [M25.511]    SUBJECTIVE  Pain Level (on 0 to 10 scale):  0 / 10   Medication Changes/New allergies or changes in medical history, any new surgeries or procedures?    no  If yes, update Summary List   Subjective Functional Status/Changes:  []  No changes reported   Pt reports the shoulder is stiff, but it is slowly getting better, it is stronger than it was     OBJECTIVE  Modalities Rationale:     decrease inflammation and decrease pain to improve patient's ability to improve functional abilities    min [] Estim, type/location:                                      []  att     []  unatt     []  w/US     []  w/ice    []  w/heat    min []  Mechanical Traction: type/lbs                   []  pro   []  sup   []  int   []  cont    []  before manual    []  after manual    min []  Ultrasound, settings/location:      min []  Iontophoresis w/ dexamethasone, location:                                               []  take home patch       []  in clinic   10 min [x]  Ice     []  Heat    location/position: R shoulder/seated    min []  Vasopneumatic Device, press/temp:    If using vaso (only need to measure limb vaso being performed on)      pre-treatment girth :       post-treatment girth :       measured at (landmark location) :    Vasopnuematic compression justification:  Per bilateral girth measures taken and listed above the edema is considered significant and having an impact on the patient's     min []  Other:    [x] Skin assessment post-treatment (if applicable):    [x]  intact [x]  redness- no adverse reaction                  []redness  adverse reaction:      38 min Therapeutic Exercise:  [x]  See flow sheet   Rationale:      increase ROM and increase strength to improve the patients ability to improve functional abilities      14 min Manual Therapy: Supine ER/IR with elbow flexion/ext combination stretching, supine manual extension with IR stretching Gr III distraction with flexion stretching, manual abduction with Gr III posterior glide   Rationale:      decrease pain, increase ROM, increase tissue extensibility, decrease trigger points and increase postural awareness to improve patient's ability to improve functional mobility   The manual therapy interventions were performed at a separate and distinct time from the therapeutic activities interventions. Billed With/As:   [] TE   [] TA   [] Neuro   [] Self Care Patient Education: [x] Review HEP    [] Progressed/Changed HEP based on:   [] positioning   [] body mechanics   [] transfers   [] heat/ice application    [] other:      Other Objective/Functional Measures:  See DC   Post Treatment Pain Level (on 0 to 10) scale:   0  / 10     ASSESSMENT  Assessment/Changes in Function:  See DC      []  See Progress Note/Recertification   Patient will continue to benefit from skilled PT services to modify and progress therapeutic interventions, address functional mobility deficits, address ROM deficits, address strength deficits, analyze and address soft tissue restrictions, analyze and cue movement patterns, analyze and modify body mechanics/ergonomics, assess and modify postural abnormalities and instruct in home and community integration to attain remaining goals.    Progress toward goals / Updated goals: See DC      PLAN  [x]  Upgrade activities as tolerated yes Continue plan of care   []  Discharge due to :    []  Other:      Therapist: Blanca Alexandra    Date: 2/3/2022 Time: 5:59 PM     Future Appointments   Date Time Provider Department Center   2/3/2022  6:00 PM Tylor Settler [facility]

## 2022-02-03 NOTE — PROGRESS NOTES
[address] [facility] PHYSICAL THERAPY  [address], [place], [address] - Phone: [phone]  Fax: [phone]  DISCHARGE SUMMARY  Patient Name: [place] : 1955   Treatment/Medical Diagnosis: Neck pain [M54.2]  Pain in right shoulder [M25.511]   Referral Source: Sam Christian DO     Date of Initial Visit: 10/28/21 Attended Visits: 27 Missed Visits: 1     SUMMARY OF TREATMENT  Pt has attended 27 sessions of PT for the tx of R adhesive capsulitis/neck strain. PT tx has consisted of therex, manual tx, modalities prn, and HEP in order to improve c/s flexibility/ROM, GHJ mobility, periscapular stability, RC strength and dec pain. CURRENT STATUS  Pt reports 95% overall improvement in sx since start of care. In this time he demonstrated continued improvements in ROM and strength which have likely contributed to pt self reported improvements as well as improved FOTO score. Pt has met all LTG, and although he has not yet reached full ROM, flexibility will likely continue to improve with adherence to HEP. Pt will DC today at I with HEP, he was wished well and instructed to contact PT with any questions re HEP and to return to skilled care should his function decline or sx worsen. Improvements: grooming, washing hair, dressing, reaching overhead, reduction in pain, able to lift overhead  Remaining functional limitations/impairments: ROM for throwing     R shoulder AROM: Flexion= 145 deg; Abduction= 150 deg; IR= FIR to T6, GREGORIA to T1  R shoulder PROM: Flexion= 155 deg; Abduction= 155 deg; ER (scapular plane)= 45 deg;  IR (scapular plane)=50 deg    R shoulder strength measurements/MMT: Flexion= 4+/5; Abduction= 4/5; ER= 4-/5; IR= 5/5  Mid trap MMT 3+/5, low trap 3-/5, SA 4/5     FOTO 65/100, GROC +7    Goal/Measure of Progress Goal Met? 1.   Pt will demo R shoulder flexion AROM >140 degrees in order to reach overhead cabinets with ease   Status at last Eval: 130 deg Current Status: 145 deg yes   2. Pt will demo R shoulder GREGORIA to T1 for ease of grooming ADL's   Status at last Eval: C2 Current Status: T1 yes   3. Pt will demo R shoulder ER strength to >4/5 for improved stabilization for reaching and lifting    Status at last Eval: 3+/5 Current Status: ER= 4-/5; no, near met     4. Pt will demo I w/ extensive, long-term HEP for continued progress in ROM/mobility and strength upon DC from PT   Status at last Eval: needs continued progression Current Status: I with updated long term HEP yes     RECOMMENDATIONS  Discontinue therapy. Progressing towards or have reached established goals. If you have any questions/comments please contact us directly at [phone]. Thank you for allowing us to assist in the care of your patient.     Therapist Signature: Emily Cole Date: 2/3/22     Time: 5:02 PM

## 2022-08-02 ENCOUNTER — APPOINTMENT (OUTPATIENT)
Dept: PHYSICAL THERAPY | Age: 67
End: 2022-08-02